# Patient Record
Sex: MALE | Race: OTHER | Employment: FULL TIME | URBAN - METROPOLITAN AREA
[De-identification: names, ages, dates, MRNs, and addresses within clinical notes are randomized per-mention and may not be internally consistent; named-entity substitution may affect disease eponyms.]

---

## 2019-12-19 ENCOUNTER — APPOINTMENT (OUTPATIENT)
Dept: CV DIAGNOSTICS | Facility: HOSPITAL | Age: 44
DRG: 270 | End: 2019-12-19
Attending: INTERNAL MEDICINE
Payer: COMMERCIAL

## 2019-12-19 ENCOUNTER — HOSPITAL ENCOUNTER (INPATIENT)
Facility: HOSPITAL | Age: 44
LOS: 2 days | Discharge: HOME | DRG: 270 | End: 2019-12-21
Attending: EMERGENCY MEDICINE | Admitting: INTERNAL MEDICINE
Payer: COMMERCIAL

## 2019-12-19 ENCOUNTER — APPOINTMENT (OUTPATIENT)
Dept: GENERAL RADIOLOGY | Facility: HOSPITAL | Age: 44
DRG: 270 | End: 2019-12-19
Attending: EMERGENCY MEDICINE
Payer: COMMERCIAL

## 2019-12-19 ENCOUNTER — APPOINTMENT (OUTPATIENT)
Dept: INTERVENTIONAL RADIOLOGY/VASCULAR | Facility: HOSPITAL | Age: 44
DRG: 270 | End: 2019-12-19
Attending: INTERNAL MEDICINE
Payer: COMMERCIAL

## 2019-12-19 DIAGNOSIS — I21.3 ST ELEVATION MYOCARDIAL INFARCTION (STEMI), UNSPECIFIED ARTERY (HCC): Primary | ICD-10-CM

## 2019-12-19 PROCEDURE — 027034Z DILATION OF CORONARY ARTERY, ONE ARTERY WITH DRUG-ELUTING INTRALUMINAL DEVICE, PERCUTANEOUS APPROACH: ICD-10-PCS | Performed by: INTERNAL MEDICINE

## 2019-12-19 PROCEDURE — 4A023N7 MEASUREMENT OF CARDIAC SAMPLING AND PRESSURE, LEFT HEART, PERCUTANEOUS APPROACH: ICD-10-PCS | Performed by: INTERNAL MEDICINE

## 2019-12-19 PROCEDURE — 99223 1ST HOSP IP/OBS HIGH 75: CPT | Performed by: INTERNAL MEDICINE

## 2019-12-19 PROCEDURE — 71045 X-RAY EXAM CHEST 1 VIEW: CPT | Performed by: EMERGENCY MEDICINE

## 2019-12-19 PROCEDURE — B2111ZZ FLUOROSCOPY OF MULTIPLE CORONARY ARTERIES USING LOW OSMOLAR CONTRAST: ICD-10-PCS | Performed by: INTERNAL MEDICINE

## 2019-12-19 PROCEDURE — B241ZZ3 ULTRASONOGRAPHY OF MULTIPLE CORONARY ARTERIES, INTRAVASCULAR: ICD-10-PCS | Performed by: INTERNAL MEDICINE

## 2019-12-19 PROCEDURE — 93306 TTE W/DOPPLER COMPLETE: CPT | Performed by: INTERNAL MEDICINE

## 2019-12-19 PROCEDURE — 5A02210 ASSISTANCE WITH CARDIAC OUTPUT USING BALLOON PUMP, CONTINUOUS: ICD-10-PCS | Performed by: INTERNAL MEDICINE

## 2019-12-19 PROCEDURE — 5A2204Z RESTORATION OF CARDIAC RHYTHM, SINGLE: ICD-10-PCS | Performed by: INTERNAL MEDICINE

## 2019-12-19 RX ORDER — METOPROLOL TARTRATE 5 MG/5ML
INJECTION INTRAVENOUS
Status: COMPLETED
Start: 2019-12-19 | End: 2019-12-19

## 2019-12-19 RX ORDER — MORPHINE SULFATE 4 MG/ML
4 INJECTION, SOLUTION INTRAMUSCULAR; INTRAVENOUS ONCE
Status: COMPLETED | OUTPATIENT
Start: 2019-12-19 | End: 2019-12-19

## 2019-12-19 RX ORDER — ENALAPRIL MALEATE 5 MG/1
5 TABLET ORAL 2 TIMES DAILY
Status: DISCONTINUED | OUTPATIENT
Start: 2019-12-19 | End: 2019-12-21

## 2019-12-19 RX ORDER — AMIODARONE HYDROCHLORIDE 50 MG/ML
INJECTION, SOLUTION INTRAVENOUS
Status: COMPLETED
Start: 2019-12-19 | End: 2019-12-19

## 2019-12-19 RX ORDER — MORPHINE SULFATE 2 MG/ML
INJECTION, SOLUTION INTRAMUSCULAR; INTRAVENOUS
Status: DISPENSED
Start: 2019-12-19 | End: 2019-12-19

## 2019-12-19 RX ORDER — ONDANSETRON 2 MG/ML
4 INJECTION INTRAMUSCULAR; INTRAVENOUS ONCE
Status: COMPLETED | OUTPATIENT
Start: 2019-12-19 | End: 2019-12-19

## 2019-12-19 RX ORDER — MORPHINE SULFATE 2 MG/ML
2 INJECTION, SOLUTION INTRAMUSCULAR; INTRAVENOUS EVERY 4 HOURS PRN
Status: DISCONTINUED | OUTPATIENT
Start: 2019-12-19 | End: 2019-12-21

## 2019-12-19 RX ORDER — MIDAZOLAM HYDROCHLORIDE 1 MG/ML
INJECTION INTRAMUSCULAR; INTRAVENOUS
Status: COMPLETED
Start: 2019-12-19 | End: 2019-12-19

## 2019-12-19 RX ORDER — ASPIRIN 81 MG/1
324 TABLET, CHEWABLE ORAL ONCE
Status: COMPLETED | OUTPATIENT
Start: 2019-12-19 | End: 2019-12-19

## 2019-12-19 RX ORDER — NITROGLYCERIN 20 MG/100ML
20 INJECTION INTRAVENOUS CONTINUOUS
Status: DISCONTINUED | OUTPATIENT
Start: 2019-12-19 | End: 2019-12-21

## 2019-12-19 RX ORDER — ATORVASTATIN CALCIUM 40 MG/1
80 TABLET, FILM COATED ORAL NIGHTLY
Status: DISCONTINUED | OUTPATIENT
Start: 2019-12-19 | End: 2019-12-21

## 2019-12-19 RX ORDER — ONDANSETRON 2 MG/ML
INJECTION INTRAMUSCULAR; INTRAVENOUS
Status: DISPENSED
Start: 2019-12-19 | End: 2019-12-20

## 2019-12-19 RX ORDER — NITROGLYCERIN 0.4 MG/1
0.4 TABLET SUBLINGUAL ONCE
Status: COMPLETED | OUTPATIENT
Start: 2019-12-19 | End: 2019-12-19

## 2019-12-19 RX ORDER — ASPIRIN 81 MG/1
81 TABLET ORAL DAILY
Status: DISCONTINUED | OUTPATIENT
Start: 2019-12-20 | End: 2019-12-21

## 2019-12-19 RX ORDER — NITROGLYCERIN 20 MG/100ML
INJECTION INTRAVENOUS
Status: COMPLETED
Start: 2019-12-19 | End: 2019-12-19

## 2019-12-19 RX ORDER — MORPHINE SULFATE 4 MG/ML
INJECTION, SOLUTION INTRAMUSCULAR; INTRAVENOUS
Status: COMPLETED
Start: 2019-12-19 | End: 2019-12-19

## 2019-12-19 RX ORDER — LIDOCAINE HYDROCHLORIDE 20 MG/ML
INJECTION, SOLUTION EPIDURAL; INFILTRATION; INTRACAUDAL; PERINEURAL
Status: COMPLETED
Start: 2019-12-19 | End: 2019-12-19

## 2019-12-19 RX ORDER — ACETAMINOPHEN 325 MG/1
650 TABLET ORAL EVERY 6 HOURS PRN
Status: DISCONTINUED | OUTPATIENT
Start: 2019-12-19 | End: 2019-12-21

## 2019-12-19 RX ORDER — SODIUM CHLORIDE 9 MG/ML
INJECTION, SOLUTION INTRAVENOUS CONTINUOUS
Status: ACTIVE | OUTPATIENT
Start: 2019-12-19 | End: 2019-12-19

## 2019-12-19 RX ORDER — ONDANSETRON 2 MG/ML
4 INJECTION INTRAMUSCULAR; INTRAVENOUS EVERY 6 HOURS PRN
Status: DISCONTINUED | OUTPATIENT
Start: 2019-12-19 | End: 2019-12-21

## 2019-12-19 RX ORDER — MIDAZOLAM HYDROCHLORIDE 1 MG/ML
INJECTION INTRAMUSCULAR; INTRAVENOUS
Status: DISCONTINUED
Start: 2019-12-19 | End: 2019-12-19 | Stop reason: WASHOUT

## 2019-12-19 RX ORDER — HEPARIN SODIUM 1000 [USP'U]/ML
INJECTION, SOLUTION INTRAVENOUS; SUBCUTANEOUS
Status: COMPLETED
Start: 2019-12-19 | End: 2019-12-19

## 2019-12-19 NOTE — H&P
Dorota Song 87 Patient Status:  Inpatient    1975 MRN [id number]   Location [facility] 2W/SW Attending Paulette Perez DO   Hosp Day # 0 PCP No primary care provider on file.      Date of Followed by  Amiodarone HCl (CORDARONE) 450 mg in dextrose 5 % 250 mL infusion, 0.5 mg/min, Intravenous, Continuous  nitroGLYCERIN infusion 50mg in D5W 250ml, 20 mcg/min, Intravenous, Continuous  Enalapril Maleate (VASOTEC) tab 5 mg, 5 mg, Oral, BID  morp PTP 12.6 12/19/2019     12/19/2019    MG 2.5 12/19/2019    TROP <0.045 12/19/2019         Imaging  Xr Chest Ap Portable  (cpt=71045)    Result Date: 12/19/2019  PROCEDURE: XR CHEST AP PORTABLE (CPT=71010) TIME: 06:46  COMPARISON: None.   INDICATIO

## 2019-12-19 NOTE — PLAN OF CARE
C/O chest pain and nauseated. Informed cardiology APN Radhika. EKG done. Zofran given. Pain rated zero without any further intervention.

## 2019-12-19 NOTE — IVS NOTE
Patient went Vfib arrest in transit patient received cardioversion 150 joules with conversion to sinus rhythm patient a/o, patient delivered to Cath Lab

## 2019-12-19 NOTE — PROCEDURES
Preop: Acute anterior wall myocardial infarction  Postop: Same  Procedure: Left heart cath, coronary angiogram.  PCI of LAD. PTCA of diagonal.  Intravascular ultrasound. Intra-aortic balloon pump.     Findings: Circumflex: Mild disease, RCA normal.  LAD 1

## 2019-12-19 NOTE — PROGRESS NOTES
Prior to leaving the Cath Lab it was noted that the pressure tracing for the balloon pump was flat. We attempted to aspirate the balloon pump but were unable. It is likely that it thrombosed. Therefore we removed the balloon pump and placed a FemoStop.

## 2019-12-19 NOTE — DIETARY NOTE
NUTRITION:  Diet Education    Consult received for diet education per protocol. Education deferred until s/p intervention and when appropriate for teaching.     Eden Elizalde RD,LDN  [phone]

## 2019-12-19 NOTE — CONSULTS
[facility]    PATIENT'S NAME: Lorenza Morgan   ATTENDING PHYSICIAN: Shimon Sanchez. [facility]: Bakari Lyles MD   PATIENT ACCOUNT#:   [id number]    LOCATION:  [address] [place] [address] RECORD #:   [id number]       DATE OF BIRTH: present. No organomegaly, masses, bruits, or pulsations. EXTREMITIES:  Warm and dry. Pulses diminished, but present. No cyanosis, clubbing, or edema. No calf, thigh, or popliteal tenderness.   NEUROLOGIC:  Normal.  SKIN:  Normal.     LABORATORY DATA:

## 2019-12-19 NOTE — PROCEDURES
[facility]    PATIENT'S NAME: Cyrus Ruvalcaba   ATTENDING PHYSICIAN: Kan Patel. Benson Rosario DO   OPERATING PHYSICIAN: Rocky Zelaya MD   PATIENT ACCOUNT#:   [id number]    LOCATION:  [address] [place] [address] RECORD #:   [id number]       DATE OF BIRTH:  0 descending artery gives off a moderate-sized diagonal just proximal to the takeoff of the major first septal . Just after the takeoff of that diagonal and the septal , the LAD is totally occluded.       RIGHT CORONARY ANGIOGRAPHY:  Afte the apical segment of the LAD. High-pressure balloon inflations were made along the entire length of the LAD stenting, with improvement in proximal flow.   We then placed an [place] aspiration catheter into the apical segment and retrieved a moderate amount

## 2019-12-19 NOTE — ED PROVIDER NOTES
Patient Seen in: [facility] AND [facility] Emergency Department      History   Patient presents with:  Chest Pain    Stated Complaint: chest pain    HPI    26-year-old male presents for complaint of chest pain.   Patient states that he was brushing his teeth this Lids are normal.      Extraocular Movements: Extraocular movements intact. Pupils: Pupils are equal, round, and reactive to light. Neck:      Musculoskeletal: Normal range of motion and neck supple.    Cardiovascular:      Rate and Rhythm: Normal rat BASIC METABOLIC PANEL (8)   TROPONIN I   MAGNESIUM   HEPATIC FUNCTION PANEL (7)   LIPASE   RAINBOW DRAW BLUE   RAINBOW DRAW LAVENDER   RAINBOW DRAW LIGHT GREEN   RAINBOW DRAW GOLD   CBC W/ DIFFERENTIAL     EKG    Rate, intervals and axes as noted on EKG

## 2019-12-19 NOTE — ED INITIAL ASSESSMENT (HPI)
Sharp midsternal chest pain appx 30 minutes ago, pt awake and getting ready for work at onset of symptoms.

## 2019-12-19 NOTE — CONSULTS
Cardiology (consult dictated)    Assessment:  1. Acute anterior wall myocardial infarction. Hemodynamically stable. Ongoing severe chest pain. No prior cardiac history or significant past medical history    Plan:  1.   Aspirin, nitroglycerin has been gi

## 2019-12-20 PROCEDURE — 99232 SBSQ HOSP IP/OBS MODERATE 35: CPT | Performed by: INTERNAL MEDICINE

## 2019-12-20 RX ORDER — METOPROLOL SUCCINATE 25 MG/1
25 TABLET, EXTENDED RELEASE ORAL
Status: DISCONTINUED | OUTPATIENT
Start: 2019-12-20 | End: 2019-12-21

## 2019-12-20 RX ORDER — HEPARIN SODIUM AND DEXTROSE 10000; 5 [USP'U]/100ML; G/100ML
12 INJECTION INTRAVENOUS ONCE
Status: COMPLETED | OUTPATIENT
Start: 2019-12-20 | End: 2019-12-20

## 2019-12-20 RX ORDER — HEPARIN SODIUM 1000 [USP'U]/ML
5000 INJECTION, SOLUTION INTRAVENOUS; SUBCUTANEOUS ONCE
Status: COMPLETED | OUTPATIENT
Start: 2019-12-20 | End: 2019-12-20

## 2019-12-20 RX ORDER — SPIRONOLACTONE 25 MG/1
25 TABLET ORAL DAILY
Status: DISCONTINUED | OUTPATIENT
Start: 2019-12-20 | End: 2019-12-21

## 2019-12-20 RX ORDER — HEPARIN SODIUM AND DEXTROSE 10000; 5 [USP'U]/100ML; G/100ML
INJECTION INTRAVENOUS CONTINUOUS
Status: DISCONTINUED | OUTPATIENT
Start: 2019-12-20 | End: 2019-12-21

## 2019-12-20 NOTE — CARDIAC REHAB
Cardiac Rehab Phase I    Education:  Handouts provided and reviewed: Caring For Your Heart Booklet, CV Procedure Site Care Handout and Smoking Cessation Handout, and Controlling Your Risk Factors. Diet: Healthy Cardiac diet reviewed.     Disease Proce

## 2019-12-20 NOTE — PROGRESS NOTES
[facility]     Progress Note        Kaitlynn Mojica Patient Status:  Inpatient    1975 MRN [id number]   Location [facility] 2W/SW Attending Lucy Moran, DO   Hosp Day # 1 PCP Kelton Finley       Subjective:   Patient s dry      Results:     Lab Results   Component Value Date    WBC 13.9 12/20/2019    HGB 13.7 12/20/2019    HCT 38.7 12/20/2019    .0 12/20/2019    CREATSERUM 1.05 12/20/2019    BUN 12 12/20/2019     12/20/2019    K 3.9 12/20/2019     12/2

## 2019-12-20 NOTE — PLAN OF CARE
Problem: Patient Centered Care  Goal: Patient preferences are identified and integrated in the patient's plan of care  Description  Interventions:  - What would you like us to know as we care for you? From home lives with wife; Never been hospitalized.

## 2019-12-20 NOTE — DIETARY NOTE
NUTRITION EDUCATION NOTE    Received consult for nutrition education per cardiac rehab order set. Appropriate education and handout(s) provided. See education section of Epic for specifics.     Joanne Tucker RDN, LDN  Clinical Nutrition  Ext [phone]

## 2019-12-21 VITALS
HEART RATE: 79 BPM | SYSTOLIC BLOOD PRESSURE: 105 MMHG | OXYGEN SATURATION: 98 % | WEIGHT: 197 LBS | DIASTOLIC BLOOD PRESSURE: 69 MMHG | RESPIRATION RATE: 18 BRPM | TEMPERATURE: 99 F | BODY MASS INDEX: 29.86 KG/M2 | HEIGHT: 68 IN

## 2019-12-21 PROCEDURE — 99232 SBSQ HOSP IP/OBS MODERATE 35: CPT | Performed by: INTERNAL MEDICINE

## 2019-12-21 PROCEDURE — 99239 HOSP IP/OBS DSCHRG MGMT >30: CPT | Performed by: HOSPITALIST

## 2019-12-21 RX ORDER — METOPROLOL SUCCINATE 25 MG/1
25 TABLET, EXTENDED RELEASE ORAL
Qty: 60 TABLET | Refills: 3 | Status: SHIPPED | OUTPATIENT
Start: 2019-12-21 | End: 2019-12-21

## 2019-12-21 RX ORDER — SPIRONOLACTONE 25 MG/1
25 TABLET ORAL DAILY
Qty: 30 TABLET | Refills: 3 | Status: SHIPPED | OUTPATIENT
Start: 2019-12-22

## 2019-12-21 RX ORDER — ATORVASTATIN CALCIUM 80 MG/1
80 TABLET, FILM COATED ORAL NIGHTLY
Qty: 30 TABLET | Refills: 3 | Status: SHIPPED | OUTPATIENT
Start: 2019-12-21

## 2019-12-21 RX ORDER — METOPROLOL SUCCINATE 25 MG/1
25 TABLET, EXTENDED RELEASE ORAL DAILY
Qty: 30 TABLET | Refills: 3 | Status: SHIPPED | OUTPATIENT
Start: 2019-12-21

## 2019-12-21 RX ORDER — ENALAPRIL MALEATE 5 MG/1
5 TABLET ORAL 2 TIMES DAILY
Qty: 60 TABLET | Refills: 3 | Status: SHIPPED | OUTPATIENT
Start: 2019-12-21

## 2019-12-21 NOTE — PROGRESS NOTES
[facility]    Progress Note      Assessment and Plan:   1. Status post ST elevation myocardial infarction status post stenting of LAD–the patient is breathing comfortably without chest pain this morning.     Recommendations: Agree with disc

## 2019-12-21 NOTE — PLAN OF CARE
Pt received awake and alert. Denies any pain or SOB at this time. Heparin gtt running on assessment. No bleeding noted. Heparin gtt stopped and xarelto given. OK for discharge. Pt aware. Telemetry and peripheral IV removed. Site CDI.  Medications picks up b environment to reduce risk of injury  - Provide assistive devices as appropriate  - Consider OT/PT consult to assist with strengthening/mobility  - Encourage toileting schedule  Outcome: Adequate for Discharge

## 2019-12-21 NOTE — PROGRESS NOTES
[facility]    Cardiology Progress Note    Clifton Khanna Patient Status:  Inpatient    1975 MRN [id number]   Location [facility] [place] 3W/SW Attending Anjelica Roblero MD   [facility] Day # 2 PCP Kellie Cabrera     Subjective:   Guilherme Montes De Oca apical dyskinesis. Will stop heparin and start xarelto 20 mg po daily. - cardiac rehab and dietician following patient  - patient is stable for d/c home today. Follow-up with SUZI AN in one week and then with Dr. Mumtaz Vargas.       Results:     Lab Results

## 2019-12-21 NOTE — PLAN OF CARE
Problem: Patient Centered Care  Goal: Patient preferences are identified and integrated in the patient's plan of care  Description  Interventions:  - What would you like us to know as we care for you?   - Provide timely, complete, and accurate informatio Term Goal: be free of pain     Interventions:   - monitor VS   - heparin drip   - Pain medications   - nonpharm methods   - See additional Care Plan goals for specific interventions  Outcome: Progressing     Problem: SAFETY ADULT - FALL  Goal: Free from fa

## 2019-12-21 NOTE — DISCHARGE SUMMARY
[facility] Discharge Summary   Patient ID:  Nayeli Bass  [id number]  40year old  7/14/1975    Admit date: 12/19/2019  Discharge date: 12/21/2019  Primary Care Physician: Carlos Dutton   Attending Physician: Prakash Stein MD   Consults: apparent distress, comfortable  NEURO: A/A Ox3, no focal deficits  RESP: non labored, CTAB/L  CARDIO: Regular, no murmur  ABD: soft, NT, ND  EXTREMITIES: no edema, no calf tenderness    Operative Procedures:   Radiology:   Xr Chest Ap Portable  (cpt=71045) Medicine  Contact information:  [address] [facility]             Janeth Andre MD In 2 weeks.     Specialty:  CARDIOLOGY  Contact information:  [address]  [address]

## 2019-12-22 NOTE — PAYOR COMM NOTE
--------------  DISCHARGE REVIEW    Payor: [address] #:  [id number]  Authorization Number: [id number]    Admit date: 12/19/19  Admit time:  0933  Discharge Date: 12/21/2019  2:00 PM     Admitting Physician: Shaheen Sears

## 2019-12-23 ENCOUNTER — TELEPHONE (OUTPATIENT)
Dept: CASE MANAGEMENT | Age: 44
End: 2019-12-23

## 2019-12-31 ENCOUNTER — OFFICE VISIT (OUTPATIENT)
Dept: CARDIOLOGY CLINIC | Facility: CLINIC | Age: 44
End: 2019-12-31
Payer: COMMERCIAL

## 2019-12-31 ENCOUNTER — APPOINTMENT (OUTPATIENT)
Dept: LAB | Age: 44
End: 2019-12-31
Attending: NURSE PRACTITIONER
Payer: COMMERCIAL

## 2019-12-31 VITALS
HEART RATE: 60 BPM | BODY MASS INDEX: 30.01 KG/M2 | WEIGHT: 198 LBS | DIASTOLIC BLOOD PRESSURE: 66 MMHG | HEIGHT: 68 IN | RESPIRATION RATE: 18 BRPM | SYSTOLIC BLOOD PRESSURE: 104 MMHG

## 2019-12-31 DIAGNOSIS — I25.10 CORONARY ARTERY DISEASE INVOLVING NATIVE CORONARY ARTERY OF NATIVE HEART WITHOUT ANGINA PECTORIS: ICD-10-CM

## 2019-12-31 DIAGNOSIS — I25.10 CORONARY ARTERY DISEASE INVOLVING NATIVE CORONARY ARTERY OF NATIVE HEART WITHOUT ANGINA PECTORIS: Primary | ICD-10-CM

## 2019-12-31 LAB
ALT SERPL-CCNC: 36 U/L (ref 16–61)
ALT SERPL-CCNC: 36 UNITS/L
ANION GAP SERPL CALC-SCNC: 4 MMOL/L (ref 0–18)
ANION GAP SERPL CALC-SCNC: NORMAL MMOL/L
AST SERPL-CCNC: 15 U/L (ref 15–37)
AST SERPL-CCNC: 15 UNITS/L
BUN BLD-MCNC: 22 MG/DL (ref 7–18)
BUN SERPL-MCNC: 22 MG/DL
BUN/CREAT SERPL: 17.9 (ref 10–20)
BUN/CREAT SERPL: NORMAL
CALCIUM BLD-MCNC: 9.7 MG/DL (ref 8.5–10.1)
CALCIUM SERPL-MCNC: 9.7 MG/DL
CHLORIDE SERPL-SCNC: 102 MMOL/L
CHLORIDE SERPL-SCNC: 102 MMOL/L (ref 98–112)
CHOLEST SERPL-MCNC: 97 MG/DL
CHOLEST SMN-MCNC: 97 MG/DL (ref ?–200)
CHOLEST/HDLC SERPL: NORMAL {RATIO}
CO2 SERPL-SCNC: 29 MMOL/L (ref 21–32)
CO2 SERPL-SCNC: NORMAL MMOL/L
CREAT BLD-MCNC: 1.23 MG/DL (ref 0.7–1.3)
CREAT SERPL-MCNC: 1.23 MG/DL
GLUCOSE BLD-MCNC: 93 MG/DL (ref 70–99)
GLUCOSE SERPL-MCNC: 93 MG/DL
HDLC SERPL-MCNC: 30 MG/DL
HDLC SERPL-MCNC: 30 MG/DL (ref 40–59)
LDLC SERPL CALC-MCNC: 49 MG/DL
LDLC SERPL CALC-MCNC: 49 MG/DL (ref ?–100)
LENGTH OF FAST TIME PATIENT: NO H
LENGTH OF FAST TIME PATIENT: NORMAL H
NONHDLC SERPL-MCNC: 67 MG/DL
NONHDLC SERPL-MCNC: 67 MG/DL (ref ?–130)
OSMOLALITY SERPL CALC.SUM OF ELEC: 283 MOSM/KG (ref 275–295)
PATIENT FASTING Y/N/NP: NO
PATIENT FASTING Y/N/NP: NO
POTASSIUM SERPL-SCNC: 5 MMOL/L
POTASSIUM SERPL-SCNC: 5 MMOL/L (ref 3.5–5.1)
SODIUM SERPL-SCNC: 135 MMOL/L
SODIUM SERPL-SCNC: 135 MMOL/L (ref 136–145)
TRIGL SERPL-MCNC: 89 MG/DL
TRIGL SERPL-MCNC: 89 MG/DL (ref 30–149)
VLDLC SERPL CALC-MCNC: 18 MG/DL (ref 0–30)
VLDLC SERPL CALC-MCNC: NORMAL MG/DL

## 2019-12-31 PROCEDURE — 99214 OFFICE O/P EST MOD 30 MIN: CPT | Performed by: NURSE PRACTITIONER

## 2019-12-31 PROCEDURE — 1111F DSCHRG MED/CURRENT MED MERGE: CPT | Performed by: NURSE PRACTITIONER

## 2019-12-31 PROCEDURE — 80061 LIPID PANEL: CPT

## 2019-12-31 PROCEDURE — 36415 COLL VENOUS BLD VENIPUNCTURE: CPT

## 2019-12-31 PROCEDURE — 80048 BASIC METABOLIC PNL TOTAL CA: CPT

## 2019-12-31 PROCEDURE — 84460 ALANINE AMINO (ALT) (SGPT): CPT

## 2019-12-31 PROCEDURE — 84450 TRANSFERASE (AST) (SGOT): CPT

## 2019-12-31 PROCEDURE — 99212 OFFICE O/P EST SF 10 MIN: CPT | Performed by: NURSE PRACTITIONER

## 2019-12-31 NOTE — PATIENT INSTRUCTIONS
1. Continue same medications  2. Start cardiac rehab  3. Check cholesterol in 2 months  4. Follow up with Dr. Bishop Vázquez in 3-4 weeks  5.  Blood test today
Yes

## 2019-12-31 NOTE — PROGRESS NOTES
Evaristo Gonzales is a 40year old male. Patient presents with:  Hospital F/U: rt groin check  CAD: PTCA w/ stent 12/19/19  Dizziness: slight dizziness w/ exertion    HPI:   Patient comes in today for follow-up.  He sees Dr. Larissa Ga after he presented for [facility] 12/20/19   • Coronary atherosclerosis    • Heart attack [facility])       Social History:  Social History    Tobacco Use      Smoking status: Current Every Day Smoker        Packs/day: 0.50        Years: 7.00        Pack years: 3.5    Alcohol use:  Yes      Alcoh months he will get a cholesterol check. The patient indicates understanding of these issues and agrees to the plan. The patient is asked to return in 4 weeks.       JENNY Troy  12/31/2019  10:57 AM

## 2020-01-02 ENCOUNTER — APPOINTMENT (OUTPATIENT)
Dept: CARDIOLOGY | Age: 45
End: 2020-01-02

## 2020-01-03 ENCOUNTER — TELEPHONE (OUTPATIENT)
Dept: ADMINISTRATIVE | Age: 45
End: 2020-01-03

## 2020-01-03 NOTE — TELEPHONE ENCOUNTER
Dr. Scott Caba,    I have attached 2 forms that require your signature. One FMLA and other a Disab. Please sign off on form:  -Highlight the patient and hit \"Chart\" button.   -In Chart Review, w/in the Encounter tab - click 1 time on the Telephone call enc

## 2020-01-03 NOTE — TELEPHONE ENCOUNTER
Patient dropped off [facility]. and FMLA forms. Signed HIPAA and paid form fee.  SC

## 2020-01-07 ENCOUNTER — CARDPULM VISIT (OUTPATIENT)
Dept: CARDIAC REHAB | Facility: HOSPITAL | Age: 45
End: 2020-01-07
Attending: INTERNAL MEDICINE
Payer: COMMERCIAL

## 2020-01-07 DIAGNOSIS — I25.10 CORONARY ARTERY DISEASE INVOLVING NATIVE CORONARY ARTERY OF NATIVE HEART WITHOUT ANGINA PECTORIS: ICD-10-CM

## 2020-01-07 NOTE — TELEPHONE ENCOUNTER
Completed FMLA and Disab. Signed by Antonio Adan and faxed to [address] [place] [address] [phone]. Patient notified and will  forms at the [facility] location, [facility] check in desk.  SC

## 2020-01-08 ENCOUNTER — CARDPULM VISIT (OUTPATIENT)
Dept: CARDIAC REHAB | Facility: HOSPITAL | Age: 45
End: 2020-01-08
Attending: INTERNAL MEDICINE
Payer: COMMERCIAL

## 2020-01-08 PROCEDURE — 93798 PHYS/QHP OP CAR RHAB W/ECG: CPT

## 2020-01-09 ENCOUNTER — TELEPHONE (OUTPATIENT)
Dept: CARDIOLOGY CLINIC | Facility: CLINIC | Age: 45
End: 2020-01-09

## 2020-01-09 NOTE — TELEPHONE ENCOUNTER
Notes recorded by JENNY Wade on 1/6/2020 at 5:16 PM CST  Lipids are stable, continue present management. Recheck in 1 yr. AST/ALT/BMP stable as well    No FYI on chart. Patient's mailbox full. [facility] number left.  Labs stable, letter mailed w

## 2020-01-09 NOTE — TELEPHONE ENCOUNTER
Pt's wife, Adrian Infante, returned call. No POPEYE on file. Pt was able to gave verbal ok to give test results to wife. Informed of Caitlin's message. Verbalized understanding.

## 2020-01-10 ENCOUNTER — CARDPULM VISIT (OUTPATIENT)
Dept: CARDIAC REHAB | Facility: HOSPITAL | Age: 45
End: 2020-01-10
Attending: INTERNAL MEDICINE
Payer: COMMERCIAL

## 2020-01-10 PROCEDURE — 93798 PHYS/QHP OP CAR RHAB W/ECG: CPT

## 2020-01-13 ENCOUNTER — CARDPULM VISIT (OUTPATIENT)
Dept: CARDIAC REHAB | Facility: HOSPITAL | Age: 45
End: 2020-01-13
Attending: INTERNAL MEDICINE
Payer: COMMERCIAL

## 2020-01-13 PROCEDURE — 93798 PHYS/QHP OP CAR RHAB W/ECG: CPT

## 2020-01-14 ENCOUNTER — TELEPHONE (OUTPATIENT)
Dept: CARDIOLOGY | Age: 45
End: 2020-01-14

## 2020-01-15 ENCOUNTER — CARDPULM VISIT (OUTPATIENT)
Dept: CARDIAC REHAB | Facility: HOSPITAL | Age: 45
End: 2020-01-15
Attending: INTERNAL MEDICINE
Payer: COMMERCIAL

## 2020-01-15 PROCEDURE — 93798 PHYS/QHP OP CAR RHAB W/ECG: CPT

## 2020-01-16 ENCOUNTER — TELEPHONE (OUTPATIENT)
Dept: CARDIOLOGY | Age: 45
End: 2020-01-16

## 2020-01-17 ENCOUNTER — CARDPULM VISIT (OUTPATIENT)
Dept: CARDIAC REHAB | Facility: HOSPITAL | Age: 45
End: 2020-01-17
Attending: INTERNAL MEDICINE
Payer: COMMERCIAL

## 2020-01-17 PROCEDURE — 93798 PHYS/QHP OP CAR RHAB W/ECG: CPT

## 2020-01-20 ENCOUNTER — CARDPULM VISIT (OUTPATIENT)
Dept: CARDIAC REHAB | Facility: HOSPITAL | Age: 45
End: 2020-01-20
Attending: INTERNAL MEDICINE
Payer: COMMERCIAL

## 2020-01-20 PROCEDURE — 93798 PHYS/QHP OP CAR RHAB W/ECG: CPT

## 2020-01-22 ENCOUNTER — CARDPULM VISIT (OUTPATIENT)
Dept: CARDIAC REHAB | Facility: HOSPITAL | Age: 45
End: 2020-01-22
Attending: INTERNAL MEDICINE
Payer: COMMERCIAL

## 2020-01-22 PROCEDURE — 93798 PHYS/QHP OP CAR RHAB W/ECG: CPT

## 2020-01-24 ENCOUNTER — CARDPULM VISIT (OUTPATIENT)
Dept: CARDIAC REHAB | Facility: HOSPITAL | Age: 45
End: 2020-01-24
Attending: INTERNAL MEDICINE
Payer: COMMERCIAL

## 2020-01-24 PROCEDURE — 93798 PHYS/QHP OP CAR RHAB W/ECG: CPT

## 2020-01-27 ENCOUNTER — CARDPULM VISIT (OUTPATIENT)
Dept: CARDIAC REHAB | Facility: HOSPITAL | Age: 45
End: 2020-01-27
Attending: INTERNAL MEDICINE
Payer: COMMERCIAL

## 2020-01-27 PROCEDURE — 93798 PHYS/QHP OP CAR RHAB W/ECG: CPT

## 2020-01-31 ENCOUNTER — CARDPULM VISIT (OUTPATIENT)
Dept: CARDIAC REHAB | Facility: HOSPITAL | Age: 45
End: 2020-01-31
Attending: INTERNAL MEDICINE
Payer: COMMERCIAL

## 2020-01-31 PROCEDURE — 93798 PHYS/QHP OP CAR RHAB W/ECG: CPT

## 2020-02-03 ENCOUNTER — CARDPULM VISIT (OUTPATIENT)
Dept: CARDIAC REHAB | Facility: HOSPITAL | Age: 45
End: 2020-02-03
Attending: INTERNAL MEDICINE
Payer: COMMERCIAL

## 2020-02-03 PROCEDURE — 93798 PHYS/QHP OP CAR RHAB W/ECG: CPT

## 2020-02-03 RX ORDER — ATORVASTATIN CALCIUM 80 MG/1
80 TABLET, FILM COATED ORAL AT BEDTIME
COMMUNITY
Start: 2019-12-21 | End: 2020-02-04 | Stop reason: SDUPTHER

## 2020-02-03 RX ORDER — SPIRONOLACTONE 25 MG/1
25 TABLET ORAL DAILY
COMMUNITY
Start: 2019-12-22 | End: 2020-02-04 | Stop reason: SDUPTHER

## 2020-02-03 RX ORDER — METOPROLOL SUCCINATE 25 MG/1
25 TABLET, EXTENDED RELEASE ORAL DAILY
COMMUNITY
Start: 2019-12-21 | End: 2020-02-04 | Stop reason: SDUPTHER

## 2020-02-03 RX ORDER — ENALAPRIL MALEATE 5 MG/1
5 TABLET ORAL 2 TIMES DAILY
COMMUNITY
Start: 2019-12-21 | End: 2020-02-04 | Stop reason: SDUPTHER

## 2020-02-04 ENCOUNTER — OFFICE VISIT (OUTPATIENT)
Dept: CARDIOLOGY | Age: 45
End: 2020-02-04

## 2020-02-04 VITALS
HEIGHT: 67 IN | OXYGEN SATURATION: 99 % | DIASTOLIC BLOOD PRESSURE: 68 MMHG | SYSTOLIC BLOOD PRESSURE: 104 MMHG | WEIGHT: 200.5 LBS | HEART RATE: 72 BPM | RESPIRATION RATE: 16 BRPM | BODY MASS INDEX: 31.47 KG/M2

## 2020-02-04 DIAGNOSIS — I25.10 CORONARY ARTERY DISEASE INVOLVING NATIVE CORONARY ARTERY OF NATIVE HEART WITHOUT ANGINA PECTORIS: Primary | ICD-10-CM

## 2020-02-04 DIAGNOSIS — I21.02 ST ELEVATION MYOCARDIAL INFARCTION INVOLVING LEFT ANTERIOR DESCENDING (LAD) CORONARY ARTERY (CMD): ICD-10-CM

## 2020-02-04 PROBLEM — I25.5 ISCHEMIC CARDIOMYOPATHY: Status: ACTIVE | Noted: 2020-02-04

## 2020-02-04 PROCEDURE — 99214 OFFICE O/P EST MOD 30 MIN: CPT | Performed by: INTERNAL MEDICINE

## 2020-02-04 RX ORDER — SPIRONOLACTONE 25 MG/1
25 TABLET ORAL DAILY
Qty: 90 TABLET | Refills: 3 | Status: SHIPPED | OUTPATIENT
Start: 2020-02-04 | End: 2021-02-05

## 2020-02-04 RX ORDER — ENALAPRIL MALEATE 5 MG/1
5 TABLET ORAL 2 TIMES DAILY
Qty: 90 TABLET | Refills: 3 | Status: SHIPPED | OUTPATIENT
Start: 2020-02-04 | End: 2020-10-23

## 2020-02-04 RX ORDER — ATORVASTATIN CALCIUM 80 MG/1
80 TABLET, FILM COATED ORAL AT BEDTIME
Qty: 90 TABLET | Refills: 3 | Status: SHIPPED | OUTPATIENT
Start: 2020-02-04 | End: 2020-02-10 | Stop reason: SDUPTHER

## 2020-02-04 RX ORDER — METOPROLOL SUCCINATE 25 MG/1
25 TABLET, EXTENDED RELEASE ORAL DAILY
Qty: 90 TABLET | Refills: 3 | Status: SHIPPED | OUTPATIENT
Start: 2020-02-04 | End: 2021-02-05

## 2020-02-04 ASSESSMENT — PATIENT HEALTH QUESTIONNAIRE - PHQ9
SUM OF ALL RESPONSES TO PHQ9 QUESTIONS 1 AND 2: 1
1. LITTLE INTEREST OR PLEASURE IN DOING THINGS: NOT AT ALL
2. FEELING DOWN, DEPRESSED OR HOPELESS: SEVERAL DAYS
SUM OF ALL RESPONSES TO PHQ9 QUESTIONS 1 AND 2: 1

## 2020-02-05 ENCOUNTER — CARDPULM VISIT (OUTPATIENT)
Dept: CARDIAC REHAB | Facility: HOSPITAL | Age: 45
End: 2020-02-05
Attending: INTERNAL MEDICINE
Payer: COMMERCIAL

## 2020-02-05 PROCEDURE — 93798 PHYS/QHP OP CAR RHAB W/ECG: CPT

## 2020-02-07 ENCOUNTER — ADVANCED DIRECTIVES (OUTPATIENT)
Dept: CARDIOLOGY | Age: 45
End: 2020-02-07

## 2020-02-07 ENCOUNTER — CARDPULM VISIT (OUTPATIENT)
Dept: CARDIAC REHAB | Facility: HOSPITAL | Age: 45
End: 2020-02-07
Attending: INTERNAL MEDICINE
Payer: COMMERCIAL

## 2020-02-07 PROCEDURE — 93798 PHYS/QHP OP CAR RHAB W/ECG: CPT

## 2020-02-10 ENCOUNTER — CARDPULM VISIT (OUTPATIENT)
Dept: CARDIAC REHAB | Facility: HOSPITAL | Age: 45
End: 2020-02-10
Attending: INTERNAL MEDICINE
Payer: COMMERCIAL

## 2020-02-10 PROCEDURE — 93798 PHYS/QHP OP CAR RHAB W/ECG: CPT

## 2020-02-10 RX ORDER — ATORVASTATIN CALCIUM 80 MG/1
80 TABLET, FILM COATED ORAL AT BEDTIME
Qty: 90 TABLET | Refills: 3 | Status: SHIPPED | OUTPATIENT
Start: 2020-03-10 | End: 2020-02-11 | Stop reason: SDUPTHER

## 2020-02-10 RX ORDER — ATORVASTATIN CALCIUM 80 MG/1
80 TABLET, FILM COATED ORAL DAILY
Qty: 30 TABLET | Refills: 0 | Status: SHIPPED | OUTPATIENT
Start: 2020-02-10 | End: 2020-02-11 | Stop reason: CLARIF

## 2020-02-11 RX ORDER — ATORVASTATIN CALCIUM 80 MG/1
80 TABLET, FILM COATED ORAL AT BEDTIME
Qty: 90 TABLET | Refills: 3 | Status: SHIPPED | OUTPATIENT
Start: 2020-03-10 | End: 2021-05-14

## 2020-02-12 ENCOUNTER — CARDPULM VISIT (OUTPATIENT)
Dept: CARDIAC REHAB | Facility: HOSPITAL | Age: 45
End: 2020-02-12
Attending: INTERNAL MEDICINE
Payer: COMMERCIAL

## 2020-02-12 PROCEDURE — 93798 PHYS/QHP OP CAR RHAB W/ECG: CPT

## 2020-02-14 ENCOUNTER — CARDPULM VISIT (OUTPATIENT)
Dept: CARDIAC REHAB | Facility: HOSPITAL | Age: 45
End: 2020-02-14
Attending: INTERNAL MEDICINE
Payer: COMMERCIAL

## 2020-02-14 PROCEDURE — 93798 PHYS/QHP OP CAR RHAB W/ECG: CPT

## 2020-02-17 ENCOUNTER — CARDPULM VISIT (OUTPATIENT)
Dept: CARDIAC REHAB | Facility: HOSPITAL | Age: 45
End: 2020-02-17
Attending: INTERNAL MEDICINE
Payer: COMMERCIAL

## 2020-02-17 PROCEDURE — 93798 PHYS/QHP OP CAR RHAB W/ECG: CPT

## 2020-02-19 ENCOUNTER — CARDPULM VISIT (OUTPATIENT)
Dept: CARDIAC REHAB | Facility: HOSPITAL | Age: 45
End: 2020-02-19
Attending: INTERNAL MEDICINE
Payer: COMMERCIAL

## 2020-02-19 PROCEDURE — 93798 PHYS/QHP OP CAR RHAB W/ECG: CPT

## 2020-02-21 ENCOUNTER — CARDPULM VISIT (OUTPATIENT)
Dept: CARDIAC REHAB | Facility: HOSPITAL | Age: 45
End: 2020-02-21
Attending: INTERNAL MEDICINE
Payer: COMMERCIAL

## 2020-02-21 PROCEDURE — 93798 PHYS/QHP OP CAR RHAB W/ECG: CPT

## 2020-02-24 ENCOUNTER — CARDPULM VISIT (OUTPATIENT)
Dept: CARDIAC REHAB | Facility: HOSPITAL | Age: 45
End: 2020-02-24
Attending: INTERNAL MEDICINE
Payer: COMMERCIAL

## 2020-02-24 PROCEDURE — 93798 PHYS/QHP OP CAR RHAB W/ECG: CPT

## 2020-02-26 ENCOUNTER — CARDPULM VISIT (OUTPATIENT)
Dept: CARDIAC REHAB | Facility: HOSPITAL | Age: 45
End: 2020-02-26
Attending: INTERNAL MEDICINE
Payer: COMMERCIAL

## 2020-02-26 PROCEDURE — 93798 PHYS/QHP OP CAR RHAB W/ECG: CPT

## 2020-02-28 ENCOUNTER — APPOINTMENT (OUTPATIENT)
Dept: CARDIAC REHAB | Facility: HOSPITAL | Age: 45
End: 2020-02-28
Attending: INTERNAL MEDICINE
Payer: COMMERCIAL

## 2020-02-28 PROCEDURE — 93798 PHYS/QHP OP CAR RHAB W/ECG: CPT

## 2020-03-02 ENCOUNTER — APPOINTMENT (OUTPATIENT)
Dept: CARDIAC REHAB | Facility: HOSPITAL | Age: 45
End: 2020-03-02
Attending: INTERNAL MEDICINE
Payer: COMMERCIAL

## 2020-03-04 ENCOUNTER — APPOINTMENT (OUTPATIENT)
Dept: CARDIAC REHAB | Facility: HOSPITAL | Age: 45
End: 2020-03-04
Attending: INTERNAL MEDICINE
Payer: COMMERCIAL

## 2020-03-06 ENCOUNTER — CARDPULM VISIT (OUTPATIENT)
Dept: CARDIAC REHAB | Facility: HOSPITAL | Age: 45
End: 2020-03-06
Attending: INTERNAL MEDICINE
Payer: COMMERCIAL

## 2020-03-06 PROCEDURE — 93798 PHYS/QHP OP CAR RHAB W/ECG: CPT

## 2020-03-09 ENCOUNTER — APPOINTMENT (OUTPATIENT)
Dept: CARDIAC REHAB | Facility: HOSPITAL | Age: 45
End: 2020-03-09
Attending: INTERNAL MEDICINE
Payer: COMMERCIAL

## 2020-03-09 PROCEDURE — 93798 PHYS/QHP OP CAR RHAB W/ECG: CPT

## 2020-03-11 ENCOUNTER — CARDPULM VISIT (OUTPATIENT)
Dept: CARDIAC REHAB | Facility: HOSPITAL | Age: 45
End: 2020-03-11
Attending: INTERNAL MEDICINE
Payer: COMMERCIAL

## 2020-03-11 ENCOUNTER — ANCILLARY PROCEDURE (OUTPATIENT)
Dept: CARDIOLOGY | Age: 45
End: 2020-03-11
Attending: INTERNAL MEDICINE

## 2020-03-11 DIAGNOSIS — I21.02 ST ELEVATION MYOCARDIAL INFARCTION INVOLVING LEFT ANTERIOR DESCENDING (LAD) CORONARY ARTERY (CMD): ICD-10-CM

## 2020-03-11 DIAGNOSIS — I25.10 CORONARY ARTERY DISEASE INVOLVING NATIVE CORONARY ARTERY OF NATIVE HEART WITHOUT ANGINA PECTORIS: ICD-10-CM

## 2020-03-11 PROCEDURE — 93308 TTE F-UP OR LMTD: CPT

## 2020-03-11 PROCEDURE — 93798 PHYS/QHP OP CAR RHAB W/ECG: CPT

## 2020-03-13 ENCOUNTER — APPOINTMENT (OUTPATIENT)
Dept: CARDIAC REHAB | Facility: HOSPITAL | Age: 45
End: 2020-03-13
Attending: INTERNAL MEDICINE
Payer: COMMERCIAL

## 2020-03-16 ENCOUNTER — APPOINTMENT (OUTPATIENT)
Dept: CARDIAC REHAB | Facility: HOSPITAL | Age: 45
End: 2020-03-16
Attending: INTERNAL MEDICINE
Payer: COMMERCIAL

## 2020-03-17 ENCOUNTER — TELEPHONE (OUTPATIENT)
Dept: CARDIOLOGY | Age: 45
End: 2020-03-17

## 2020-03-18 ENCOUNTER — APPOINTMENT (OUTPATIENT)
Dept: CARDIAC REHAB | Facility: HOSPITAL | Age: 45
End: 2020-03-18
Attending: INTERNAL MEDICINE
Payer: COMMERCIAL

## 2020-03-20 ENCOUNTER — APPOINTMENT (OUTPATIENT)
Dept: CARDIAC REHAB | Facility: HOSPITAL | Age: 45
End: 2020-03-20
Attending: INTERNAL MEDICINE
Payer: COMMERCIAL

## 2020-03-23 ENCOUNTER — APPOINTMENT (OUTPATIENT)
Dept: CARDIAC REHAB | Facility: HOSPITAL | Age: 45
End: 2020-03-23
Attending: INTERNAL MEDICINE
Payer: COMMERCIAL

## 2020-03-25 ENCOUNTER — APPOINTMENT (OUTPATIENT)
Dept: CARDIAC REHAB | Facility: HOSPITAL | Age: 45
End: 2020-03-25
Attending: INTERNAL MEDICINE
Payer: COMMERCIAL

## 2020-03-25 ENCOUNTER — TELEPHONE (OUTPATIENT)
Dept: CARDIOLOGY | Age: 45
End: 2020-03-25

## 2020-03-27 ENCOUNTER — APPOINTMENT (OUTPATIENT)
Dept: CARDIAC REHAB | Facility: HOSPITAL | Age: 45
End: 2020-03-27
Attending: INTERNAL MEDICINE
Payer: COMMERCIAL

## 2020-03-30 ENCOUNTER — APPOINTMENT (OUTPATIENT)
Dept: CARDIAC REHAB | Facility: HOSPITAL | Age: 45
End: 2020-03-30
Attending: INTERNAL MEDICINE
Payer: COMMERCIAL

## 2020-03-31 ENCOUNTER — OFFICE VISIT (OUTPATIENT)
Dept: CARDIOLOGY | Age: 45
End: 2020-03-31

## 2020-03-31 VITALS — HEIGHT: 67 IN | BODY MASS INDEX: 30.92 KG/M2 | WEIGHT: 197 LBS

## 2020-03-31 DIAGNOSIS — I25.10 CORONARY ARTERY DISEASE INVOLVING NATIVE CORONARY ARTERY OF NATIVE HEART WITHOUT ANGINA PECTORIS: Primary | ICD-10-CM

## 2020-03-31 DIAGNOSIS — I25.5 ISCHEMIC CARDIOMYOPATHY: ICD-10-CM

## 2020-03-31 PROCEDURE — 99214 OFFICE O/P EST MOD 30 MIN: CPT | Performed by: INTERNAL MEDICINE

## 2020-04-01 ENCOUNTER — APPOINTMENT (OUTPATIENT)
Dept: CARDIAC REHAB | Facility: HOSPITAL | Age: 45
End: 2020-04-01
Attending: INTERNAL MEDICINE
Payer: COMMERCIAL

## 2020-04-01 ENCOUNTER — TELEPHONE (OUTPATIENT)
Dept: CARDIOLOGY | Age: 45
End: 2020-04-01

## 2020-04-03 ENCOUNTER — APPOINTMENT (OUTPATIENT)
Dept: CARDIAC REHAB | Facility: HOSPITAL | Age: 45
End: 2020-04-03
Attending: INTERNAL MEDICINE
Payer: COMMERCIAL

## 2020-04-06 ENCOUNTER — APPOINTMENT (OUTPATIENT)
Dept: CARDIAC REHAB | Facility: HOSPITAL | Age: 45
End: 2020-04-06
Attending: INTERNAL MEDICINE
Payer: COMMERCIAL

## 2020-04-07 ENCOUNTER — APPOINTMENT (OUTPATIENT)
Dept: CARDIOLOGY | Age: 45
End: 2020-04-07

## 2020-04-08 ENCOUNTER — APPOINTMENT (OUTPATIENT)
Dept: CARDIAC REHAB | Facility: HOSPITAL | Age: 45
End: 2020-04-08
Attending: INTERNAL MEDICINE
Payer: COMMERCIAL

## 2020-04-20 ENCOUNTER — TELEPHONE (OUTPATIENT)
Dept: CARDIOLOGY | Age: 45
End: 2020-04-20

## 2020-06-15 ENCOUNTER — TELEPHONE (OUTPATIENT)
Dept: CARDIOLOGY | Age: 45
End: 2020-06-15

## 2020-08-02 ENCOUNTER — HOSPITAL ENCOUNTER (EMERGENCY)
Facility: HOSPITAL | Age: 45
Discharge: HOME | End: 2020-08-02
Attending: EMERGENCY MEDICINE
Payer: COMMERCIAL

## 2020-08-02 VITALS
OXYGEN SATURATION: 98 % | RESPIRATION RATE: 14 BRPM | HEART RATE: 64 BPM | DIASTOLIC BLOOD PRESSURE: 72 MMHG | TEMPERATURE: 99 F | SYSTOLIC BLOOD PRESSURE: 111 MMHG

## 2020-08-02 DIAGNOSIS — M79.602 ARM PAIN, ANTERIOR, LEFT: Primary | ICD-10-CM

## 2020-08-02 LAB
ANION GAP SERPL CALC-SCNC: 5 MMOL/L
ANION GAP SERPL CALC-SCNC: 5 MMOL/L (ref 0–18)
BASOPHILS # BLD AUTO: 0.05 X10(3) UL (ref 0–0.2)
BASOPHILS NFR BLD AUTO: 0.7 %
BUN BLD-MCNC: 12 MG/DL (ref 7–18)
BUN SERPL-MCNC: 12 MG/DL
BUN/CREAT SERPL: 10
BUN/CREAT SERPL: 10 (ref 10–20)
CALCIUM BLD-MCNC: 8.5 MG/DL (ref 8.5–10.1)
CALCIUM SERPL-MCNC: 8.5 MG/DL
CHLORIDE SERPL-SCNC: 108 MMOL/L
CHLORIDE SERPL-SCNC: 108 MMOL/L (ref 98–112)
CO2 SERPL-SCNC: 26 MMOL/L
CO2 SERPL-SCNC: 26 MMOL/L (ref 21–32)
CREAT BLD-MCNC: 1.2 MG/DL (ref 0.7–1.3)
CREAT SERPL-MCNC: 1.2 MG/DL
DEPRECATED RDW RBC AUTO: 41.8 FL (ref 35.1–46.3)
EOSINOPHIL # BLD AUTO: 0.19 X10(3) UL (ref 0–0.7)
EOSINOPHIL NFR BLD AUTO: 2.7 %
ERYTHROCYTE [DISTWIDTH] IN BLOOD BY AUTOMATED COUNT: 12.6 % (ref 11–15)
GLUCOSE BLD-MCNC: 117 MG/DL (ref 70–99)
GLUCOSE SERPL-MCNC: 117 MG/DL
HCT VFR BLD AUTO: 42.5 % (ref 39–53)
HCT VFR BLD CALC: 42.5 %
HGB BLD-MCNC: 15.1 G/DL
HGB BLD-MCNC: 15.1 G/DL (ref 13–17.5)
IMM GRANULOCYTES # BLD AUTO: 0.02 X10(3) UL (ref 0–1)
IMM GRANULOCYTES NFR BLD: 0.3 %
LYMPHOCYTES # BLD AUTO: 2.62 X10(3) UL (ref 1–4)
LYMPHOCYTES NFR BLD AUTO: 36.7 %
MCH RBC QN AUTO: 32.1 PG
MCH RBC QN AUTO: 32.1 PG (ref 26–34)
MCHC RBC AUTO-ENTMCNC: 35.5 G/DL
MCHC RBC AUTO-ENTMCNC: 35.5 G/DL (ref 31–37)
MCV RBC AUTO: 90.2 FL
MCV RBC AUTO: 90.2 FL (ref 80–100)
MONOCYTES # BLD AUTO: 0.59 X10(3) UL (ref 0.1–1)
MONOCYTES NFR BLD AUTO: 8.3 %
NEUTROPHILS # BLD AUTO: 3.67 X10 (3) UL (ref 1.5–7.7)
NEUTROPHILS # BLD AUTO: 3.67 X10(3) UL (ref 1.5–7.7)
NEUTROPHILS NFR BLD AUTO: 51.3 %
OSMOLALITY SERPL CALC.SUM OF ELEC: 289 MOSM/KG (ref 275–295)
PLATELET # BLD AUTO: 294 10(3)UL (ref 150–450)
PLATELET # BLD: 294 K/MCL
POTASSIUM SERPL-SCNC: 4 MMOL/L
POTASSIUM SERPL-SCNC: 4 MMOL/L (ref 3.5–5.1)
RBC # BLD AUTO: 4.71 X10(6)UL (ref 4.3–5.7)
RBC # BLD: 4.71 10*6/UL
SODIUM SERPL-SCNC: 139 MMOL/L
SODIUM SERPL-SCNC: 139 MMOL/L (ref 136–145)
TROPONIN I SERPL-MCNC: <0.045 NG/ML (ref ?–0.04)
WBC # BLD AUTO: 7.1 X10(3) UL (ref 4–11)
WBC # BLD: 7.1 K/MCL

## 2020-08-02 PROCEDURE — 85025 COMPLETE CBC W/AUTO DIFF WBC: CPT | Performed by: EMERGENCY MEDICINE

## 2020-08-02 PROCEDURE — 36415 COLL VENOUS BLD VENIPUNCTURE: CPT

## 2020-08-02 PROCEDURE — 80048 BASIC METABOLIC PNL TOTAL CA: CPT | Performed by: EMERGENCY MEDICINE

## 2020-08-02 PROCEDURE — 93010 ELECTROCARDIOGRAM REPORT: CPT | Performed by: EMERGENCY MEDICINE

## 2020-08-02 PROCEDURE — 84484 ASSAY OF TROPONIN QUANT: CPT | Performed by: EMERGENCY MEDICINE

## 2020-08-02 PROCEDURE — 99283 EMERGENCY DEPT VISIT LOW MDM: CPT

## 2020-08-02 PROCEDURE — 93005 ELECTROCARDIOGRAM TRACING: CPT

## 2020-08-03 NOTE — ED PROVIDER NOTES
Patient Seen in: [facility] Emergency Department    History   Patient presents with:  Chest Pain Angina      HPI    The patient presents to the ED complaining of left anterior arm pain that started a couple hours ago.   He describes the pain is in measures to prevent infection transmission during my interaction with the patient were taken. The patient was already wearing a droplet mask on my arrival to the room.  Personal protective equipment including droplet mask, eye protection, and gloves were wo Abnormality         Status                                     ---------                               -----------         ------                                     CBC W/ DIFFERENTIAL[[id number]]                              Final result and/or caregiver.       Condition upon leaving the department: Stable    Disposition and Plan     Clinical Impression:  Arm pain, anterior, left  (primary encounter diagnosis)    Disposition:  Discharge    Follow-up:  Lexy Kraft  [address]

## 2020-09-14 ENCOUNTER — ANCILLARY PROCEDURE (OUTPATIENT)
Dept: CARDIOLOGY | Age: 45
End: 2020-09-14
Attending: INTERNAL MEDICINE

## 2020-09-14 DIAGNOSIS — I25.5 ISCHEMIC CARDIOMYOPATHY: ICD-10-CM

## 2020-09-14 DIAGNOSIS — I25.10 CORONARY ARTERY DISEASE INVOLVING NATIVE CORONARY ARTERY OF NATIVE HEART WITHOUT ANGINA PECTORIS: ICD-10-CM

## 2020-09-14 PROCEDURE — 93306 TTE W/DOPPLER COMPLETE: CPT | Performed by: INTERNAL MEDICINE

## 2020-09-25 ENCOUNTER — OFFICE VISIT (OUTPATIENT)
Dept: CARDIOLOGY | Age: 45
End: 2020-09-25

## 2020-09-25 VITALS
HEIGHT: 67 IN | DIASTOLIC BLOOD PRESSURE: 80 MMHG | SYSTOLIC BLOOD PRESSURE: 120 MMHG | HEART RATE: 73 BPM | BODY MASS INDEX: 34.21 KG/M2 | WEIGHT: 218 LBS | OXYGEN SATURATION: 99 %

## 2020-09-25 DIAGNOSIS — I25.10 CORONARY ARTERY DISEASE INVOLVING NATIVE CORONARY ARTERY OF NATIVE HEART WITHOUT ANGINA PECTORIS: Primary | ICD-10-CM

## 2020-09-25 DIAGNOSIS — I25.5 ISCHEMIC CARDIOMYOPATHY: ICD-10-CM

## 2020-09-25 PROCEDURE — 99214 OFFICE O/P EST MOD 30 MIN: CPT | Performed by: INTERNAL MEDICINE

## 2020-09-25 ASSESSMENT — PATIENT HEALTH QUESTIONNAIRE - PHQ9
CLINICAL INTERPRETATION OF PHQ2 SCORE: NO FURTHER SCREENING NEEDED
CLINICAL INTERPRETATION OF PHQ9 SCORE: NO FURTHER SCREENING NEEDED
SUM OF ALL RESPONSES TO PHQ9 QUESTIONS 1 AND 2: 0
2. FEELING DOWN, DEPRESSED OR HOPELESS: NOT AT ALL
1. LITTLE INTEREST OR PLEASURE IN DOING THINGS: NOT AT ALL
SUM OF ALL RESPONSES TO PHQ9 QUESTIONS 1 AND 2: 0

## 2020-10-23 RX ORDER — ENALAPRIL MALEATE 5 MG/1
TABLET ORAL
Qty: 90 TABLET | Refills: 3 | Status: SHIPPED | OUTPATIENT
Start: 2020-10-23 | End: 2021-06-11

## 2021-02-05 RX ORDER — METOPROLOL SUCCINATE 25 MG/1
TABLET, EXTENDED RELEASE ORAL
Qty: 90 TABLET | Refills: 0 | Status: SHIPPED | OUTPATIENT
Start: 2021-02-05 | End: 2021-04-21

## 2021-02-05 RX ORDER — SPIRONOLACTONE 25 MG/1
TABLET ORAL
Qty: 90 TABLET | Refills: 0 | Status: SHIPPED | OUTPATIENT
Start: 2021-02-05 | End: 2021-04-21

## 2021-03-10 ENCOUNTER — ANCILLARY PROCEDURE (OUTPATIENT)
Dept: CARDIOLOGY | Age: 46
End: 2021-03-10
Attending: INTERNAL MEDICINE

## 2021-03-10 ENCOUNTER — DOCUMENTATION (OUTPATIENT)
Dept: CARDIOLOGY | Age: 46
End: 2021-03-10

## 2021-03-10 DIAGNOSIS — I25.10 CORONARY ARTERY DISEASE INVOLVING NATIVE CORONARY ARTERY OF NATIVE HEART WITHOUT ANGINA PECTORIS: ICD-10-CM

## 2021-03-10 DIAGNOSIS — I25.5 ISCHEMIC CARDIOMYOPATHY: ICD-10-CM

## 2021-03-10 PROCEDURE — 93351 STRESS TTE COMPLETE: CPT | Performed by: INTERNAL MEDICINE

## 2021-04-19 ENCOUNTER — TELEPHONE (OUTPATIENT)
Dept: CARDIOLOGY | Age: 46
End: 2021-04-19

## 2021-04-21 RX ORDER — METOPROLOL SUCCINATE 25 MG/1
25 TABLET, EXTENDED RELEASE ORAL DAILY
Qty: 90 TABLET | Refills: 0 | Status: SHIPPED | OUTPATIENT
Start: 2021-04-21

## 2021-04-21 RX ORDER — SPIRONOLACTONE 25 MG/1
25 TABLET ORAL DAILY
Qty: 90 TABLET | Refills: 0 | Status: SHIPPED | OUTPATIENT
Start: 2021-04-21

## 2021-04-21 RX ORDER — METOPROLOL SUCCINATE 25 MG/1
25 TABLET, EXTENDED RELEASE ORAL DAILY
Qty: 90 TABLET | Refills: 0 | Status: SHIPPED | OUTPATIENT
Start: 2021-04-21 | End: 2021-04-21 | Stop reason: SDUPTHER

## 2021-04-21 RX ORDER — SPIRONOLACTONE 25 MG/1
25 TABLET ORAL DAILY
Qty: 90 TABLET | Refills: 0 | Status: SHIPPED | OUTPATIENT
Start: 2021-04-21 | End: 2021-04-21 | Stop reason: SDUPTHER

## 2021-04-23 ENCOUNTER — OFFICE VISIT (OUTPATIENT)
Dept: CARDIOLOGY | Age: 46
End: 2021-04-23

## 2021-04-23 VITALS
BODY MASS INDEX: 34.37 KG/M2 | HEART RATE: 64 BPM | WEIGHT: 219 LBS | HEIGHT: 67 IN | DIASTOLIC BLOOD PRESSURE: 78 MMHG | SYSTOLIC BLOOD PRESSURE: 120 MMHG | OXYGEN SATURATION: 98 %

## 2021-04-23 DIAGNOSIS — I25.10 CORONARY ARTERY DISEASE INVOLVING NATIVE CORONARY ARTERY OF NATIVE HEART WITHOUT ANGINA PECTORIS: Primary | ICD-10-CM

## 2021-04-23 DIAGNOSIS — I25.5 ISCHEMIC CARDIOMYOPATHY: ICD-10-CM

## 2021-04-23 PROCEDURE — 99214 OFFICE O/P EST MOD 30 MIN: CPT | Performed by: INTERNAL MEDICINE

## 2021-04-23 RX ORDER — ETANERCEPT 50 MG/ML
50 SOLUTION SUBCUTANEOUS
COMMUNITY
Start: 2020-10-09

## 2021-04-23 RX ORDER — CLOPIDOGREL BISULFATE 75 MG/1
75 TABLET ORAL DAILY
Qty: 90 TABLET | Refills: 3 | Status: SHIPPED | OUTPATIENT
Start: 2021-04-23

## 2021-04-23 ASSESSMENT — PATIENT HEALTH QUESTIONNAIRE - PHQ9
1. LITTLE INTEREST OR PLEASURE IN DOING THINGS: NOT AT ALL
SUM OF ALL RESPONSES TO PHQ9 QUESTIONS 1 AND 2: 0
2. FEELING DOWN, DEPRESSED OR HOPELESS: NOT AT ALL
CLINICAL INTERPRETATION OF PHQ9 SCORE: NO FURTHER SCREENING NEEDED
CLINICAL INTERPRETATION OF PHQ2 SCORE: NO FURTHER SCREENING NEEDED
SUM OF ALL RESPONSES TO PHQ9 QUESTIONS 1 AND 2: 0

## 2021-05-14 RX ORDER — ATORVASTATIN CALCIUM 80 MG/1
TABLET, FILM COATED ORAL
Qty: 30 TABLET | Refills: 0 | Status: SHIPPED | OUTPATIENT
Start: 2021-05-14

## 2021-05-18 ENCOUNTER — TELEPHONE (OUTPATIENT)
Dept: CARDIOLOGY | Age: 46
End: 2021-05-18

## 2021-06-11 RX ORDER — ENALAPRIL MALEATE 5 MG/1
TABLET ORAL
Qty: 90 TABLET | Refills: 3 | Status: SHIPPED | OUTPATIENT
Start: 2021-06-11

## 2021-07-09 ENCOUNTER — TELEPHONE (OUTPATIENT)
Dept: CARDIOLOGY | Age: 46
End: 2021-07-09

## 2021-07-28 ENCOUNTER — TELEPHONE (OUTPATIENT)
Dept: CARDIOLOGY | Age: 46
End: 2021-07-28

## 2021-11-03 ENCOUNTER — HOSPITAL ENCOUNTER (OUTPATIENT)
Dept: GENERAL RADIOLOGY | Facility: HOSPITAL | Age: 46
Discharge: HOME | End: 2021-11-03
Attending: INTERNAL MEDICINE
Payer: COMMERCIAL

## 2021-11-03 ENCOUNTER — LAB ENCOUNTER (OUTPATIENT)
Dept: LAB | Facility: HOSPITAL | Age: 46
End: 2021-11-03
Attending: INTERNAL MEDICINE
Payer: COMMERCIAL

## 2021-11-03 ENCOUNTER — OFFICE VISIT (OUTPATIENT)
Dept: RHEUMATOLOGY | Facility: CLINIC | Age: 46
End: 2021-11-03
Payer: COMMERCIAL

## 2021-11-03 VITALS
BODY MASS INDEX: 31.83 KG/M2 | WEIGHT: 210 LBS | DIASTOLIC BLOOD PRESSURE: 81 MMHG | HEART RATE: 66 BPM | HEIGHT: 68 IN | SYSTOLIC BLOOD PRESSURE: 147 MMHG

## 2021-11-03 DIAGNOSIS — L40.50 PSORIATIC ARTHRITIS (HCC): ICD-10-CM

## 2021-11-03 DIAGNOSIS — L40.9 PSORIASIS: Primary | ICD-10-CM

## 2021-11-03 DIAGNOSIS — L40.9 PSORIASIS: ICD-10-CM

## 2021-11-03 DIAGNOSIS — M54.32 SCIATICA OF LEFT SIDE: ICD-10-CM

## 2021-11-03 PROCEDURE — 85652 RBC SED RATE AUTOMATED: CPT | Performed by: INTERNAL MEDICINE

## 2021-11-03 PROCEDURE — 36415 COLL VENOUS BLD VENIPUNCTURE: CPT

## 2021-11-03 PROCEDURE — 86200 CCP ANTIBODY: CPT | Performed by: INTERNAL MEDICINE

## 2021-11-03 PROCEDURE — 85025 COMPLETE CBC W/AUTO DIFF WBC: CPT | Performed by: INTERNAL MEDICINE

## 2021-11-03 PROCEDURE — 86140 C-REACTIVE PROTEIN: CPT | Performed by: INTERNAL MEDICINE

## 2021-11-03 PROCEDURE — 99204 OFFICE O/P NEW MOD 45 MIN: CPT | Performed by: INTERNAL MEDICINE

## 2021-11-03 PROCEDURE — 86480 TB TEST CELL IMMUN MEASURE: CPT

## 2021-11-03 PROCEDURE — 72110 X-RAY EXAM L-2 SPINE 4/>VWS: CPT | Performed by: INTERNAL MEDICINE

## 2021-11-03 PROCEDURE — 87340 HEPATITIS B SURFACE AG IA: CPT | Performed by: INTERNAL MEDICINE

## 2021-11-03 PROCEDURE — 86706 HEP B SURFACE ANTIBODY: CPT | Performed by: INTERNAL MEDICINE

## 2021-11-03 PROCEDURE — 3079F DIAST BP 80-89 MM HG: CPT | Performed by: INTERNAL MEDICINE

## 2021-11-03 PROCEDURE — 80053 COMPREHEN METABOLIC PANEL: CPT | Performed by: INTERNAL MEDICINE

## 2021-11-03 PROCEDURE — 3008F BODY MASS INDEX DOCD: CPT | Performed by: INTERNAL MEDICINE

## 2021-11-03 PROCEDURE — 73502 X-RAY EXAM HIP UNI 2-3 VIEWS: CPT | Performed by: INTERNAL MEDICINE

## 2021-11-03 PROCEDURE — 86431 RHEUMATOID FACTOR QUANT: CPT | Performed by: INTERNAL MEDICINE

## 2021-11-03 PROCEDURE — 3077F SYST BP >= 140 MM HG: CPT | Performed by: INTERNAL MEDICINE

## 2021-11-03 PROCEDURE — 86803 HEPATITIS C AB TEST: CPT | Performed by: INTERNAL MEDICINE

## 2021-11-03 PROCEDURE — 72202 X-RAY EXAM SI JOINTS 3/> VWS: CPT | Performed by: INTERNAL MEDICINE

## 2021-11-03 PROCEDURE — 86704 HEP B CORE ANTIBODY TOTAL: CPT | Performed by: INTERNAL MEDICINE

## 2021-11-03 RX ORDER — METHYLPREDNISOLONE 4 MG/1
TABLET ORAL
Qty: 1 EACH | Refills: 0 | Status: SHIPPED | OUTPATIENT
Start: 2021-11-03

## 2021-11-03 RX ORDER — ETANERCEPT 50 MG/ML
SOLUTION SUBCUTANEOUS
COMMUNITY
Start: 2021-09-14

## 2021-11-03 NOTE — PATIENT INSTRUCTIONS
You were seen today for psoriasis and psoriatic arthritis  Recommend to continue Enbrel weekly  For your left hip/lower back pain lets get x-rays  Try a Medrol dose pack  Also get blood work today  Follow-up in the next 2 to 3 months

## 2021-11-03 NOTE — PROGRESS NOTES
Jason Menard is a 55year old male who presents for Patient presents with:  Consult  Psoriatic Arthritis  Hip Pain  .    HPI:   CC: PsA  Consult: referred by PCP Dr. Lilli Peng    This is a 54 yo M with hx of HTN, HLD, CAD s/p stents (Dec 2019), Asthma presen 3      Past Medical History:   Diagnosis Date   • Arrhythmia     2 episodes of Vfib 12/20/19   • Coronary atherosclerosis    • Heart attack ([facility] [address]       No past surgical history on file. No family history on file.    Social History:  Social History    [place] pain or swelling or warmth on palpation  Elbow: FROM, no pain or swelling or warmth on palpation  Shoulders: FROM, no pain or swelling or warmth on palpation  Hip: normal log roll, no lateral hip pain, REINA test negative b/l  Knees: FROM, no warmth or eff joints have been controlled on Enbrel.   Also his nails have improved significantly therefore he want to stay on Enbrel  - Blood work today    Left hip/SI joint pain, possible sciatica  - We will obtain x-ray of the left hip, SI joint and lower spine  - Has

## 2021-11-04 ENCOUNTER — LAB ENCOUNTER (OUTPATIENT)
Dept: LAB | Facility: HOSPITAL | Age: 46
End: 2021-11-04
Attending: INTERNAL MEDICINE
Payer: COMMERCIAL

## 2021-11-04 DIAGNOSIS — M54.32 SCIATICA OF LEFT SIDE: ICD-10-CM

## 2021-11-04 DIAGNOSIS — L40.50 PSORIATIC ARTHRITIS (HCC): ICD-10-CM

## 2021-11-04 PROCEDURE — 86812 HLA TYPING A B OR C: CPT

## 2021-11-04 PROCEDURE — 36415 COLL VENOUS BLD VENIPUNCTURE: CPT

## 2021-11-10 ENCOUNTER — TELEPHONE (OUTPATIENT)
Dept: RHEUMATOLOGY | Facility: CLINIC | Age: 46
End: 2021-11-10

## 2021-11-10 ENCOUNTER — LAB ENCOUNTER (OUTPATIENT)
Dept: LAB | Facility: HOSPITAL | Age: 46
End: 2021-11-10
Attending: INTERNAL MEDICINE
Payer: COMMERCIAL

## 2021-11-10 PROCEDURE — 86812 HLA TYPING A B OR C: CPT

## 2021-11-10 PROCEDURE — 81374 HLA I TYPING 1 ANTIGEN LR: CPT

## 2021-11-10 PROCEDURE — 36415 COLL VENOUS BLD VENIPUNCTURE: CPT

## 2021-11-10 NOTE — TELEPHONE ENCOUNTER
Patient stated he received a missed call today from Dr. Adamaris Jara to review his lab results and xrays. Patient is requesting a call back.

## 2021-11-10 NOTE — TELEPHONE ENCOUNTER
Spoke with pt and he stated he have HLAB27 drawn on 11/4. Spoke with lab and it was drawn but not completed, was cancelled. Pt was to be contacted for a redraw. Pt contacted and advised to have lab completed. Pt agreeable with plan.

## 2021-11-12 ENCOUNTER — TELEPHONE (OUTPATIENT)
Dept: RHEUMATOLOGY | Facility: CLINIC | Age: 46
End: 2021-11-12

## 2021-11-12 DIAGNOSIS — M54.32 SCIATICA OF LEFT SIDE: ICD-10-CM

## 2021-11-12 DIAGNOSIS — L40.50 PSORIATIC ARTHRITIS (HCC): Primary | ICD-10-CM

## 2021-11-22 ENCOUNTER — TELEPHONE (OUTPATIENT)
Dept: RHEUMATOLOGY | Facility: CLINIC | Age: 46
End: 2021-11-22

## 2021-11-22 ENCOUNTER — OFFICE VISIT (OUTPATIENT)
Dept: RHEUMATOLOGY | Facility: CLINIC | Age: 46
End: 2021-11-22
Payer: COMMERCIAL

## 2021-11-22 VITALS
HEART RATE: 60 BPM | SYSTOLIC BLOOD PRESSURE: 144 MMHG | HEIGHT: 68 IN | WEIGHT: 226 LBS | BODY MASS INDEX: 34.25 KG/M2 | DIASTOLIC BLOOD PRESSURE: 96 MMHG

## 2021-11-22 DIAGNOSIS — L40.50 PSORIATIC ARTHRITIS (HCC): Primary | ICD-10-CM

## 2021-11-22 DIAGNOSIS — Z51.81 MEDICATION MONITORING ENCOUNTER: ICD-10-CM

## 2021-11-22 DIAGNOSIS — M47.899 HLA-B27 SPONDYLOARTHROPATHY: ICD-10-CM

## 2021-11-22 PROCEDURE — 3008F BODY MASS INDEX DOCD: CPT | Performed by: INTERNAL MEDICINE

## 2021-11-22 PROCEDURE — 99214 OFFICE O/P EST MOD 30 MIN: CPT | Performed by: INTERNAL MEDICINE

## 2021-11-22 PROCEDURE — 3080F DIAST BP >= 90 MM HG: CPT | Performed by: INTERNAL MEDICINE

## 2021-11-22 PROCEDURE — 3077F SYST BP >= 140 MM HG: CPT | Performed by: INTERNAL MEDICINE

## 2021-11-22 RX ORDER — PREDNISONE 10 MG/1
10 TABLET ORAL DAILY
Qty: 30 TABLET | Refills: 0 | Status: SHIPPED | OUTPATIENT
Start: 2021-11-22 | End: 2021-12-14

## 2021-11-22 NOTE — TELEPHONE ENCOUNTER
If you get patient approved for Cosentyx 300 mg loading and maintenance dose. He has psoriasis, psoriatic arthritis and HLA-B27 spondylarthritis. Failed Humira, Simponi and Enbrel.   Cannot take NSAIDs as he is on a blood thinner

## 2021-11-22 NOTE — PATIENT INSTRUCTIONS
You were seen today for psoriatic arthritis that is also affecting the sacroiliac jewel  Try taking prednisone 30-40 mg daily for 2-3 days if you have worsening back pain  Will try to switch to cosentyx  Follow up in 2 mos

## 2021-11-22 NOTE — PROGRESS NOTES
Shirley Serra is a 55year old male. HPI:   Patient presents with: Follow - Up      I had the pleasure of seeing Shirley Serra on 11/22/2021 for follow up PsA.      Current Medications:  Enbrel weekly- since 2020  Past medications:  Humira, Simponi, metho (Active prior to today's visit):  Current Outpatient Medications   Medication Sig Dispense Refill   • ENBREL 50 MG/ML Subcutaneous Solution Prefilled Syringe      • methylPREDNISolone 4 MG Oral Tablet Therapy Pack Take as directed on package.  1 each 0   • lower extremities, sensation intact.   PSYCH: normal mood       LABS:     Component      Latest Ref Rng & Units 11/10/2021 11/3/2021   Quantiferon TB NIL      IU/mL  0.02   Quantiferon-TB1 Minus NIL      IU/mL  0.02   Quantiferon-TB2 Minus NIL      IU/mL  0 blood thinner    Pt will f/u in 2 mos     Eddy Velazquez MD  11/22/2021   8:36 AM

## 2021-11-29 RX ORDER — SECUKINUMAB 150 MG/ML
300 INJECTION SUBCUTANEOUS AS DIRECTED
Qty: 10 EACH | Refills: 0 | Status: CANCELLED | OUTPATIENT
Start: 2021-11-29 | End: 2021-12-27

## 2021-11-29 RX ORDER — SECUKINUMAB 150 MG/ML
300 INJECTION SUBCUTANEOUS
Qty: 2 EACH | Refills: 4 | Status: CANCELLED | OUTPATIENT
Start: 2021-11-29

## 2021-11-29 NOTE — TELEPHONE ENCOUNTER
Cosentyx PA approved 11/29/2021 - 11/29/2022. Order pended. Attempted to contacted patient to inform him of this and offer in office education. No answer. Left generic message to call back. Hire An Esquirehart sent.      Dr. Brice Andrews - Please review order and advise on how

## 2021-11-30 RX ORDER — SECUKINUMAB 150 MG/ML
150 INJECTION SUBCUTANEOUS
Qty: 1 EACH | Refills: 5 | Status: SHIPPED | OUTPATIENT
Start: 2021-11-30

## 2021-11-30 RX ORDER — SECUKINUMAB 150 MG/ML
150 INJECTION SUBCUTANEOUS
Qty: 5 EACH | Refills: 0 | Status: SHIPPED | OUTPATIENT
Start: 2021-11-30 | End: 2022-01-01

## 2021-11-30 NOTE — TELEPHONE ENCOUNTER
Noted. Spoke with Dr. Livier Yang. She would like patient to hold enbrel for 1 week prior to starting cosentyx. Patient informed via [employer]. He stated he does not need teaching appointment.

## 2021-12-02 ENCOUNTER — TELEPHONE (OUTPATIENT)
Dept: RHEUMATOLOGY | Facility: CLINIC | Age: 46
End: 2021-12-02

## 2021-12-02 NOTE — TELEPHONE ENCOUNTER
Karma/ Pharmacy Technician of Chris Keenan is requesting clarification for medication Secukinumab (COSENTYX SENSOREADY PEN) 150 MG/ML Subcutaneous Solution Auto-injector.  Christian Canas states pharmacist needs to know if patient will continue to take

## 2021-12-14 RX ORDER — PREDNISONE 10 MG/1
10 TABLET ORAL DAILY
Qty: 30 TABLET | Refills: 0 | Status: SHIPPED | OUTPATIENT
Start: 2021-12-14

## 2021-12-14 NOTE — TELEPHONE ENCOUNTER
Refill request for prednisone. LOV: 11/22/2021  No future appointments.   LABS:  Component      Latest Ref Rng & Units 11/10/2021 11/3/2021   WBC      4.0 - 11.0 x10(3) uL  5.3   RBC      4.30 - 5.70 x10(6)uL  5.16   Hemoglobin      13.0 - 17.5 g/dL  16 Quantiferon TB NIL      IU/mL  0.02   Quantiferon-TB1 Minus NIL      IU/mL  0.02   Quantiferon-TB2 Minus NIL      IU/mL  0.02   Quantiferon TB Mitogen minus NIL      IU/mL  >10.00   QTB. RESULT      Negative  Negative   HEPATITIS B SURFACE AB QUAL      No

## 2022-01-07 NOTE — TELEPHONE ENCOUNTER
LOV: 11/22/21, pt due for follow up appointment  No future appointments. Labs:   Component      Latest Ref Rng & Units 11/10/2021 11/3/2021   WBC      4.0 - 11.0 x10(3) uL  5.3   RBC      4.30 - 5.70 x10(6)uL  5.16   Hemoglobin      13.0 - 17.5 g/dL  16. 2 Quantiferon TB NIL      IU/mL  0.02   Quantiferon-TB1 Minus NIL      IU/mL  0.02   Quantiferon-TB2 Minus NIL      IU/mL  0.02   Quantiferon TB Mitogen minus NIL      IU/mL  >10.00   QTB. RESULT      Negative  Negative   HEPATITIS B SURFACE AB QUAL      No

## 2022-01-10 RX ORDER — PREDNISONE 10 MG/1
TABLET ORAL
Qty: 30 TABLET | Refills: 0 | OUTPATIENT
Start: 2022-01-10

## 2022-01-10 NOTE — TELEPHONE ENCOUNTER
Called and left a VM to patient. Inform patient per Dr. Luiz Harrington wants to know if he's requesting the prednisone refill? Also he needs to schedule his 2 month follow up appointment. Please call office to help schedule appointment.

## 2022-01-14 ENCOUNTER — TELEPHONE (OUTPATIENT)
Dept: RHEUMATOLOGY | Facility: CLINIC | Age: 47
End: 2022-01-14

## 2022-01-14 NOTE — TELEPHONE ENCOUNTER
Patient has appointment scheduled on 1/17  Future Appointments   Date Time Provider Tamica Mckeon   1/17/2022  2:40 PM Adithya Perez MD [address] [facility]

## 2022-01-14 NOTE — TELEPHONE ENCOUNTER
Spoke to patient, informed that it must have been about scheduling follow-up. Patient states no refill needed at this time.    Future Appointments   Date Time Provider Tamica Mckeon   1/17/2022  2:40 PM Brett Harrison MD 2014 [facility]

## 2022-01-14 NOTE — TELEPHONE ENCOUNTER
Pt states that he received a call from Dr. Davidson  office this morning. Pt is returning the call. No message noted.

## 2022-01-17 ENCOUNTER — LAB ENCOUNTER (OUTPATIENT)
Dept: LAB | Facility: HOSPITAL | Age: 47
End: 2022-01-17
Attending: INTERNAL MEDICINE
Payer: COMMERCIAL

## 2022-01-17 ENCOUNTER — OFFICE VISIT (OUTPATIENT)
Dept: RHEUMATOLOGY | Facility: CLINIC | Age: 47
End: 2022-01-17
Payer: COMMERCIAL

## 2022-01-17 VITALS
BODY MASS INDEX: 32.74 KG/M2 | WEIGHT: 216 LBS | SYSTOLIC BLOOD PRESSURE: 112 MMHG | DIASTOLIC BLOOD PRESSURE: 70 MMHG | HEIGHT: 68 IN | HEART RATE: 61 BPM

## 2022-01-17 DIAGNOSIS — Z51.81 MEDICATION MONITORING ENCOUNTER: ICD-10-CM

## 2022-01-17 DIAGNOSIS — L40.50 PSORIATIC ARTHRITIS (HCC): ICD-10-CM

## 2022-01-17 DIAGNOSIS — M47.899 HLA-B27 SPONDYLOARTHROPATHY: ICD-10-CM

## 2022-01-17 DIAGNOSIS — L40.50 PSORIATIC ARTHRITIS (HCC): Primary | ICD-10-CM

## 2022-01-17 LAB
ALBUMIN SERPL-MCNC: 4 G/DL (ref 3.4–5)
ALT SERPL-CCNC: 36 U/L
AST SERPL-CCNC: 16 U/L (ref 15–37)
BASOPHILS # BLD AUTO: 0.04 X10(3) UL (ref 0–0.2)
BASOPHILS NFR BLD AUTO: 0.7 %
CREAT BLD-MCNC: 1.09 MG/DL
CRP SERPL-MCNC: <0.29 MG/DL (ref ?–0.3)
DEPRECATED RDW RBC AUTO: 43.4 FL (ref 35.1–46.3)
EOSINOPHIL # BLD AUTO: 0.07 X10(3) UL (ref 0–0.7)
EOSINOPHIL NFR BLD AUTO: 1.3 %
ERYTHROCYTE [DISTWIDTH] IN BLOOD BY AUTOMATED COUNT: 12.7 % (ref 11–15)
ERYTHROCYTE [SEDIMENTATION RATE] IN BLOOD: 8 MM/HR
HCT VFR BLD AUTO: 43.1 %
HGB BLD-MCNC: 14.4 G/DL
IMM GRANULOCYTES # BLD AUTO: 0.02 X10(3) UL (ref 0–1)
IMM GRANULOCYTES NFR BLD: 0.4 %
LYMPHOCYTES # BLD AUTO: 2.15 X10(3) UL (ref 1–4)
LYMPHOCYTES NFR BLD AUTO: 39.6 %
MCH RBC QN AUTO: 31 PG (ref 26–34)
MCHC RBC AUTO-ENTMCNC: 33.4 G/DL (ref 31–37)
MCV RBC AUTO: 92.7 FL
MONOCYTES # BLD AUTO: 0.5 X10(3) UL (ref 0.1–1)
MONOCYTES NFR BLD AUTO: 9.2 %
NEUTROPHILS # BLD AUTO: 2.65 X10 (3) UL (ref 1.5–7.7)
NEUTROPHILS # BLD AUTO: 2.65 X10(3) UL (ref 1.5–7.7)
NEUTROPHILS NFR BLD AUTO: 48.8 %
PLATELET # BLD AUTO: 359 10(3)UL (ref 150–450)
RBC # BLD AUTO: 4.65 X10(6)UL
WBC # BLD AUTO: 5.4 X10(3) UL (ref 4–11)

## 2022-01-17 PROCEDURE — 3008F BODY MASS INDEX DOCD: CPT | Performed by: INTERNAL MEDICINE

## 2022-01-17 PROCEDURE — 82040 ASSAY OF SERUM ALBUMIN: CPT

## 2022-01-17 PROCEDURE — 99214 OFFICE O/P EST MOD 30 MIN: CPT | Performed by: INTERNAL MEDICINE

## 2022-01-17 PROCEDURE — 84460 ALANINE AMINO (ALT) (SGPT): CPT

## 2022-01-17 PROCEDURE — 36415 COLL VENOUS BLD VENIPUNCTURE: CPT

## 2022-01-17 PROCEDURE — 85025 COMPLETE CBC W/AUTO DIFF WBC: CPT

## 2022-01-17 PROCEDURE — 3078F DIAST BP <80 MM HG: CPT | Performed by: INTERNAL MEDICINE

## 2022-01-17 PROCEDURE — 84450 TRANSFERASE (AST) (SGOT): CPT

## 2022-01-17 PROCEDURE — 85652 RBC SED RATE AUTOMATED: CPT

## 2022-01-17 PROCEDURE — 3074F SYST BP LT 130 MM HG: CPT | Performed by: INTERNAL MEDICINE

## 2022-01-17 PROCEDURE — 86140 C-REACTIVE PROTEIN: CPT

## 2022-01-17 PROCEDURE — 82565 ASSAY OF CREATININE: CPT

## 2022-01-17 NOTE — PROGRESS NOTES
Herson Pinto is a 55year old male. HPI:   Patient presents with: Follow - Up  Medication Follow-Up      I had the pleasure of seeing Herson Pinto on 1/17/2022 for follow up Seropositive spondyloarthritis/PsA (+HLA-B27, psoriasis and sacroiliitis). weekly injections of Cosentyx for 5 weeks  Reports his back pain is improved significantly.   Not able to work out  Also psoriasis in his hair, right elbow and right knee have resolved        HISTORY:  Past Medical History:   Diagnosis Date   • Arrhythmia FROM  Hands: no synovitis in DIP, PIP and MCP, strong full fists  Wrist: FROM, no pain or swelling or warmth on palpation  Elbow: FROM, no pain or swelling or warmth on palpation  Shoulders: FROM, no pain or swelling or warmth on palpation  Hip: normal log ankylosis.     ASSESSMENT/PLAN:       Psoriatic arthritis with sacroilitis (+HLA-B27 and sacroilitis)- improved  - In the past he was on Humira, Simponi, methotrexate and sulfasalazine  - Blood work showed a positive HLA-B27 and x-ray of the SI joint did sh

## 2022-01-17 NOTE — PATIENT INSTRUCTIONS
Seen today for psoriatic arthritis  On Cosentyx now monthly and doing well  Plan to continue Cosentyx  Blood work today  Follow-up in 6 months

## 2022-04-29 ENCOUNTER — APPOINTMENT (OUTPATIENT)
Dept: CARDIOLOGY | Age: 47
End: 2022-04-29

## 2022-08-15 ENCOUNTER — LAB ENCOUNTER (OUTPATIENT)
Dept: LAB | Facility: HOSPITAL | Age: 47
End: 2022-08-15
Attending: INTERNAL MEDICINE
Payer: COMMERCIAL

## 2022-08-15 ENCOUNTER — TELEPHONE (OUTPATIENT)
Dept: RHEUMATOLOGY | Facility: CLINIC | Age: 47
End: 2022-08-15

## 2022-08-15 ENCOUNTER — OFFICE VISIT (OUTPATIENT)
Dept: RHEUMATOLOGY | Facility: CLINIC | Age: 47
End: 2022-08-15
Payer: COMMERCIAL

## 2022-08-15 VITALS
BODY MASS INDEX: 31.22 KG/M2 | HEIGHT: 68 IN | HEART RATE: 56 BPM | DIASTOLIC BLOOD PRESSURE: 79 MMHG | SYSTOLIC BLOOD PRESSURE: 125 MMHG | WEIGHT: 206 LBS

## 2022-08-15 DIAGNOSIS — Z51.81 MEDICATION MONITORING ENCOUNTER: ICD-10-CM

## 2022-08-15 DIAGNOSIS — L40.50 PSORIATIC ARTHRITIS (HCC): Primary | ICD-10-CM

## 2022-08-15 DIAGNOSIS — M47.899 HLA-B27 SPONDYLOARTHROPATHY: ICD-10-CM

## 2022-08-15 DIAGNOSIS — L40.50 PSORIATIC ARTHRITIS (HCC): ICD-10-CM

## 2022-08-15 LAB
ALBUMIN SERPL-MCNC: 3.9 G/DL (ref 3.4–5)
ALT SERPL-CCNC: 27 U/L
AST SERPL-CCNC: 18 U/L (ref 15–37)
BASOPHILS # BLD AUTO: 0.05 X10(3) UL (ref 0–0.2)
BASOPHILS NFR BLD AUTO: 1.4 %
CREAT BLD-MCNC: 1.18 MG/DL
CRP SERPL-MCNC: <0.29 MG/DL (ref ?–0.3)
DEPRECATED RDW RBC AUTO: 41.6 FL (ref 35.1–46.3)
EOSINOPHIL # BLD AUTO: 0.11 X10(3) UL (ref 0–0.7)
EOSINOPHIL NFR BLD AUTO: 3 %
ERYTHROCYTE [DISTWIDTH] IN BLOOD BY AUTOMATED COUNT: 12.1 % (ref 11–15)
ERYTHROCYTE [SEDIMENTATION RATE] IN BLOOD: 6 MM/HR
GFR SERPLBLD BASED ON 1.73 SQ M-ARVRAT: 77 ML/MIN/1.73M2 (ref 60–?)
HCT VFR BLD AUTO: 42.8 %
HGB BLD-MCNC: 14.8 G/DL
IMM GRANULOCYTES # BLD AUTO: 0.01 X10(3) UL (ref 0–1)
IMM GRANULOCYTES NFR BLD: 0.3 %
LYMPHOCYTES # BLD AUTO: 1.57 X10(3) UL (ref 1–4)
LYMPHOCYTES NFR BLD AUTO: 43 %
MCH RBC QN AUTO: 32.1 PG (ref 26–34)
MCHC RBC AUTO-ENTMCNC: 34.6 G/DL (ref 31–37)
MCV RBC AUTO: 92.8 FL
MONOCYTES # BLD AUTO: 0.31 X10(3) UL (ref 0.1–1)
MONOCYTES NFR BLD AUTO: 8.5 %
NEUTROPHILS # BLD AUTO: 1.6 X10 (3) UL (ref 1.5–7.7)
NEUTROPHILS # BLD AUTO: 1.6 X10(3) UL (ref 1.5–7.7)
NEUTROPHILS NFR BLD AUTO: 43.8 %
PLATELET # BLD AUTO: 280 10(3)UL (ref 150–450)
RBC # BLD AUTO: 4.61 X10(6)UL
WBC # BLD AUTO: 3.7 X10(3) UL (ref 4–11)

## 2022-08-15 PROCEDURE — 86140 C-REACTIVE PROTEIN: CPT | Performed by: INTERNAL MEDICINE

## 2022-08-15 PROCEDURE — 3074F SYST BP LT 130 MM HG: CPT | Performed by: INTERNAL MEDICINE

## 2022-08-15 PROCEDURE — 82565 ASSAY OF CREATININE: CPT

## 2022-08-15 PROCEDURE — 84450 TRANSFERASE (AST) (SGOT): CPT

## 2022-08-15 PROCEDURE — 85652 RBC SED RATE AUTOMATED: CPT | Performed by: INTERNAL MEDICINE

## 2022-08-15 PROCEDURE — 84460 ALANINE AMINO (ALT) (SGPT): CPT

## 2022-08-15 PROCEDURE — 3078F DIAST BP <80 MM HG: CPT | Performed by: INTERNAL MEDICINE

## 2022-08-15 PROCEDURE — 36415 COLL VENOUS BLD VENIPUNCTURE: CPT

## 2022-08-15 PROCEDURE — 85025 COMPLETE CBC W/AUTO DIFF WBC: CPT | Performed by: INTERNAL MEDICINE

## 2022-08-15 PROCEDURE — 3008F BODY MASS INDEX DOCD: CPT | Performed by: INTERNAL MEDICINE

## 2022-08-15 PROCEDURE — 86480 TB TEST CELL IMMUN MEASURE: CPT

## 2022-08-15 PROCEDURE — 99214 OFFICE O/P EST MOD 30 MIN: CPT | Performed by: INTERNAL MEDICINE

## 2022-08-15 PROCEDURE — 82040 ASSAY OF SERUM ALBUMIN: CPT

## 2022-08-15 RX ORDER — SECUKINUMAB 150 MG/ML
150 INJECTION SUBCUTANEOUS
Qty: 1 EACH | Refills: 5 | Status: SHIPPED | OUTPATIENT
Start: 2022-08-15

## 2022-08-16 LAB
M TB IFN-G CD4+ T-CELLS BLD-ACNC: 0 IU/ML
M TB TUBERC IFN-G BLD QL: NEGATIVE
M TB TUBERC IGNF/MITOGEN IGNF CONTROL: >10 IU/ML
QFT TB1 AG MINUS NIL: 0.04 IU/ML
QFT TB2 AG MINUS NIL: 0.07 IU/ML

## 2022-09-21 ENCOUNTER — OFFICE VISIT (OUTPATIENT)
Dept: FAMILY MEDICINE CLINIC | Facility: CLINIC | Age: 47
End: 2022-09-21

## 2022-09-21 VITALS
BODY MASS INDEX: 31.74 KG/M2 | TEMPERATURE: 98 F | SYSTOLIC BLOOD PRESSURE: 114 MMHG | DIASTOLIC BLOOD PRESSURE: 75 MMHG | HEIGHT: 67.72 IN | HEART RATE: 63 BPM | WEIGHT: 207 LBS

## 2022-09-21 DIAGNOSIS — I25.2 HISTORY OF MI (MYOCARDIAL INFARCTION): Primary | ICD-10-CM

## 2022-09-21 DIAGNOSIS — Z12.11 SCREENING FOR COLON CANCER: ICD-10-CM

## 2022-09-21 PROCEDURE — 99203 OFFICE O/P NEW LOW 30 MIN: CPT | Performed by: FAMILY MEDICINE

## 2022-09-21 PROCEDURE — 3078F DIAST BP <80 MM HG: CPT | Performed by: FAMILY MEDICINE

## 2022-09-21 PROCEDURE — 3074F SYST BP LT 130 MM HG: CPT | Performed by: FAMILY MEDICINE

## 2022-09-21 PROCEDURE — 3008F BODY MASS INDEX DOCD: CPT | Performed by: FAMILY MEDICINE

## 2022-09-28 ENCOUNTER — NURSE ONLY (OUTPATIENT)
Dept: GASTROENTEROLOGY | Facility: CLINIC | Age: 47
End: 2022-09-28

## 2022-09-28 DIAGNOSIS — Z12.11 COLON CANCER SCREENING: Primary | ICD-10-CM

## 2022-09-28 NOTE — PROGRESS NOTES
Eliseo Jon patient for a scheduled telephone colon screening. GI MD preference: none  Insurance:  [facility]   CBC from: 8/15/2022  PCP visit on: 9/21/2022     First colonoscopy: yes     Anticoagulants: plavix Govind Pickett MD @ [facility] Cardiology   Diabetic Meds:  No   BP meds(Ace inhibitors/ARB's): ENALAPRIL   Weight loss meds (phentermine/vyvanse): no   Iron supplement (RX/OTC): no   Height & Weight/BMI: 5'7\"/207lbs/31   Hx of Cardiac/CVA issues/(MI/Stroke): MI dec. 2019  Devices Pacemaker/Defibrillator/Stents: 2 stents   Resp. Issues/Oxygen Use/ASHWIN/COPD: no   Issues w/Anesthesia: no     Symptoms (Y/N): no     Family history of colon cancer: no     Medications, pharmacy, and allergies verified with patient over the phone. Please advise on orders and prep, thank you.

## 2022-09-30 NOTE — PROGRESS NOTES
Dx: average risk crc screening  Colonoscopy with MAC sedation  Split dose suprep, if suprep not covered then golytely or moviprep, sent to pharmacy  Please review prep instructions with patient    - HOLD ACE/ARBs the night before and/or the day of the procedure(s) -   - NO herbal supplements or weight loss medications x 7 days prior to the procedure(s)

## 2022-10-06 ENCOUNTER — TELEPHONE (OUTPATIENT)
Dept: GASTROENTEROLOGY | Facility: CLINIC | Age: 47
End: 2022-10-06

## 2022-10-06 NOTE — PROGRESS NOTES
GI clincal-    Please obtain plavix adjustment orders from Dr. Girma Ricketts. Cln scheduled on: 12/22/2022 w/ Dr. Marylin Orosco    May close TE when appropriate. Thank you.

## 2022-10-06 NOTE — TELEPHONE ENCOUNTER
Fax sent to Dr. Karina Carpenter requesting orders for Plavix prior to procedure on 12/22/22. Will await response.

## 2022-11-14 ENCOUNTER — TELEPHONE (OUTPATIENT)
Dept: RHEUMATOLOGY | Facility: CLINIC | Age: 47
End: 2022-11-14

## 2022-12-16 NOTE — TELEPHONE ENCOUNTER
Spoke to Madelin from [facility]. States they received fax per Dr. Rudolph Heredia patient is ok to hold Plavix for 5 days prior. Contacted patient and reviewed instructions above to hold medication. Patient verbalized understanding.

## 2022-12-25 ENCOUNTER — SURGERY CENTER DOCUMENTATION (OUTPATIENT)
Dept: SURGERY | Age: 47
End: 2022-12-25

## 2022-12-30 ENCOUNTER — TELEPHONE (OUTPATIENT)
Facility: CLINIC | Age: 47
End: 2022-12-30

## 2022-12-30 ENCOUNTER — TELEPHONE (OUTPATIENT)
Dept: GASTROENTEROLOGY | Facility: CLINIC | Age: 47
End: 2022-12-30

## 2022-12-31 NOTE — TELEPHONE ENCOUNTER
GI Staff:    Can you please place a recall for this patient to have a colonoscopy repeated in 7 years. Thank you.     Teresita Lake MD

## 2023-01-03 NOTE — TELEPHONE ENCOUNTER
Health maintenance updated. Last colonoscopy done 12/22/22. 7 year recall placed into Pt Outreach, next due on 12/22/29 per Dr. Yao Thomas.

## 2023-04-24 RX ORDER — SECUKINUMAB 150 MG/ML
150 INJECTION SUBCUTANEOUS
Qty: 1 EACH | Refills: 5 | Status: SHIPPED | OUTPATIENT
Start: 2023-04-24

## 2023-05-05 ENCOUNTER — HOSPITAL ENCOUNTER (OUTPATIENT)
Dept: GENERAL RADIOLOGY | Facility: HOSPITAL | Age: 48
Discharge: HOME | End: 2023-05-05
Attending: INTERNAL MEDICINE
Payer: COMMERCIAL

## 2023-05-05 ENCOUNTER — LAB ENCOUNTER (OUTPATIENT)
Dept: LAB | Facility: HOSPITAL | Age: 48
End: 2023-05-05
Attending: INTERNAL MEDICINE
Payer: COMMERCIAL

## 2023-05-05 ENCOUNTER — OFFICE VISIT (OUTPATIENT)
Dept: RHEUMATOLOGY | Facility: CLINIC | Age: 48
End: 2023-05-05

## 2023-05-05 ENCOUNTER — TELEPHONE (OUTPATIENT)
Dept: RHEUMATOLOGY | Facility: CLINIC | Age: 48
End: 2023-05-05

## 2023-05-05 VITALS
DIASTOLIC BLOOD PRESSURE: 78 MMHG | SYSTOLIC BLOOD PRESSURE: 120 MMHG | BODY MASS INDEX: 32.28 KG/M2 | WEIGHT: 213 LBS | HEART RATE: 54 BPM | HEIGHT: 68 IN

## 2023-05-05 DIAGNOSIS — Z51.81 MEDICATION MONITORING ENCOUNTER: ICD-10-CM

## 2023-05-05 DIAGNOSIS — M47.899 HLA-B27 SPONDYLOARTHROPATHY: ICD-10-CM

## 2023-05-05 DIAGNOSIS — L40.50 PSORIATIC ARTHRITIS (HCC): ICD-10-CM

## 2023-05-05 DIAGNOSIS — L40.50 PSORIATIC ARTHRITIS (HCC): Primary | ICD-10-CM

## 2023-05-05 LAB
ALBUMIN SERPL-MCNC: 3.9 G/DL (ref 3.4–5)
ALT SERPL-CCNC: 28 U/L
AST SERPL-CCNC: 19 U/L (ref 15–37)
BASOPHILS # BLD AUTO: 0.05 X10(3) UL (ref 0–0.2)
BASOPHILS NFR BLD AUTO: 1.3 %
CREAT BLD-MCNC: 1.13 MG/DL
CRP SERPL-MCNC: <0.29 MG/DL (ref ?–0.3)
DEPRECATED RDW RBC AUTO: 42.1 FL (ref 35.1–46.3)
EOSINOPHIL # BLD AUTO: 0.09 X10(3) UL (ref 0–0.7)
EOSINOPHIL NFR BLD AUTO: 2.3 %
ERYTHROCYTE [DISTWIDTH] IN BLOOD BY AUTOMATED COUNT: 12.4 % (ref 11–15)
ERYTHROCYTE [SEDIMENTATION RATE] IN BLOOD: 5 MM/HR
GFR SERPLBLD BASED ON 1.73 SQ M-ARVRAT: 81 ML/MIN/1.73M2 (ref 60–?)
HCT VFR BLD AUTO: 44.8 %
HGB BLD-MCNC: 14.9 G/DL
IMM GRANULOCYTES # BLD AUTO: 0.01 X10(3) UL (ref 0–1)
IMM GRANULOCYTES NFR BLD: 0.3 %
LYMPHOCYTES # BLD AUTO: 1.66 X10(3) UL (ref 1–4)
LYMPHOCYTES NFR BLD AUTO: 42.2 %
MCH RBC QN AUTO: 30.7 PG (ref 26–34)
MCHC RBC AUTO-ENTMCNC: 33.3 G/DL (ref 31–37)
MCV RBC AUTO: 92.4 FL
MONOCYTES # BLD AUTO: 0.38 X10(3) UL (ref 0.1–1)
MONOCYTES NFR BLD AUTO: 9.7 %
NEUTROPHILS # BLD AUTO: 1.74 X10 (3) UL (ref 1.5–7.7)
NEUTROPHILS # BLD AUTO: 1.74 X10(3) UL (ref 1.5–7.7)
NEUTROPHILS NFR BLD AUTO: 44.2 %
PLATELET # BLD AUTO: 283 10(3)UL (ref 150–450)
RBC # BLD AUTO: 4.85 X10(6)UL
WBC # BLD AUTO: 3.9 X10(3) UL (ref 4–11)

## 2023-05-05 PROCEDURE — 3008F BODY MASS INDEX DOCD: CPT | Performed by: INTERNAL MEDICINE

## 2023-05-05 PROCEDURE — 99214 OFFICE O/P EST MOD 30 MIN: CPT | Performed by: INTERNAL MEDICINE

## 2023-05-05 PROCEDURE — 85025 COMPLETE CBC W/AUTO DIFF WBC: CPT

## 2023-05-05 PROCEDURE — 84450 TRANSFERASE (AST) (SGOT): CPT

## 2023-05-05 PROCEDURE — 36415 COLL VENOUS BLD VENIPUNCTURE: CPT

## 2023-05-05 PROCEDURE — 72202 X-RAY EXAM SI JOINTS 3/> VWS: CPT | Performed by: INTERNAL MEDICINE

## 2023-05-05 PROCEDURE — 82565 ASSAY OF CREATININE: CPT

## 2023-05-05 PROCEDURE — 3074F SYST BP LT 130 MM HG: CPT | Performed by: INTERNAL MEDICINE

## 2023-05-05 PROCEDURE — 84460 ALANINE AMINO (ALT) (SGPT): CPT

## 2023-05-05 PROCEDURE — 85652 RBC SED RATE AUTOMATED: CPT

## 2023-05-05 PROCEDURE — 86140 C-REACTIVE PROTEIN: CPT

## 2023-05-05 PROCEDURE — 82040 ASSAY OF SERUM ALBUMIN: CPT

## 2023-05-05 PROCEDURE — 3078F DIAST BP <80 MM HG: CPT | Performed by: INTERNAL MEDICINE

## 2023-05-05 RX ORDER — METHYLPREDNISOLONE 4 MG/1
TABLET ORAL
Qty: 1 EACH | Refills: 0 | Status: SHIPPED | OUTPATIENT
Start: 2023-05-05

## 2023-05-05 NOTE — TELEPHONE ENCOUNTER
Prior authorization for: Cosentyx    Medication form: pen     Date submitted: 5/5/23    Submission method:  Support[employer]ee    Tracking #:

## 2023-05-05 NOTE — PATIENT INSTRUCTIONS
You were seen for psoriatic arthritis  Plan to increase Cosentyx to 300 mg monthly, have to get this approved  I sent you a Medrol Dosepak to your pharmacy for now  Blood work today  X-ray  See me in 4 months if symptoms have not improved, if they have you can see me in 1 year

## 2023-05-09 RX ORDER — SECUKINUMAB 150 MG/ML
300 INJECTION SUBCUTANEOUS
Qty: 2 EACH | Refills: 5 | Status: SHIPPED | OUTPATIENT
Start: 2023-05-09 | End: 2023-05-19

## 2023-05-09 NOTE — TELEPHONE ENCOUNTER
Pended medication.      PA Approved    Prior authorization for: Cosentyx 300mg    Medication form: pen [id number]    Date received: 5/9    Approval #:     Approved dates: 5/9/23-5/9/24    Pharmacy for medication: [facility]    QF-TB results: negative 8/15/2022

## 2023-05-12 ENCOUNTER — TELEPHONE (OUTPATIENT)
Dept: RHEUMATOLOGY | Facility: CLINIC | Age: 48
End: 2023-05-12

## 2023-05-12 NOTE — TELEPHONE ENCOUNTER
Called and Spoke with [facility] pharmacist Hillary Bull to inform them Dr. Portia Francis increase medication to Secukinumab, 300 MG Dose, (COSENTYX SENSOREADY, 300 MG,) 150 MG/ML on 5/9/23. Pharmacist verbalize understanding and will update new dose to dispense.

## 2023-05-12 NOTE — TELEPHONE ENCOUNTER
[facility] calling to clarify which script should be filled and which directions to follow    Secukinumab (COSENTYX SENSOREADY PEN) 150 MG/ML Subcutaneous Solution Auto-injector    Secukinumab, 300 MG Dose, (COSENTYX SENSOREADY, 300 MG,) 150 MG/ML Subcutaneous Solution Auto-injector

## 2023-05-18 ENCOUNTER — TELEPHONE (OUTPATIENT)
Dept: RHEUMATOLOGY | Facility: CLINIC | Age: 48
End: 2023-05-18

## 2023-05-18 NOTE — TELEPHONE ENCOUNTER
Patient dropped off financial assistance forms from [email or web address]. He states its for his cosentyx medication. Please complete and call patient when form is completed.   Form in rheum's box

## 2023-05-19 RX ORDER — SECUKINUMAB 150 MG/ML
300 INJECTION SUBCUTANEOUS
Qty: 6 EACH | Refills: 3 | Status: SHIPPED
Start: 2023-05-19

## 2023-11-29 ENCOUNTER — TELEPHONE (OUTPATIENT)
Dept: RHEUMATOLOGY | Facility: CLINIC | Age: 48
End: 2023-11-29

## 2023-11-29 NOTE — TELEPHONE ENCOUNTER
Pt dropped off [facility] patient assistance paperwork for further completion regarding Cosentyx 300 mg pen. Asked once completed for a phone call for . Completed, placed on providers desk for further review and signature.

## 2023-11-29 NOTE — TELEPHONE ENCOUNTER
Called pt back, LVM to call back. [facility] also sent. Paperwork left at  for pt pickup.

## 2023-12-06 ENCOUNTER — LAB ENCOUNTER (OUTPATIENT)
Dept: LAB | Facility: HOSPITAL | Age: 48
End: 2023-12-06
Attending: INTERNAL MEDICINE
Payer: COMMERCIAL

## 2023-12-06 DIAGNOSIS — E78.5 HYPERLIPIDEMIA, UNSPECIFIED: ICD-10-CM

## 2023-12-06 DIAGNOSIS — I21.3 STEMI (ST ELEVATION MYOCARDIAL INFARCTION) (HCC): ICD-10-CM

## 2023-12-06 DIAGNOSIS — I25.10 CORONARY ARTERY DISEASE INVOLVING NATIVE CORONARY ARTERY OF NATIVE HEART WITHOUT ANGINA PECTORIS: Primary | ICD-10-CM

## 2023-12-06 LAB
ALBUMIN SERPL-MCNC: 4.5 G/DL (ref 3.2–4.8)
ALBUMIN/GLOB SERPL: 1.6 {RATIO} (ref 1–2)
ALP LIVER SERPL-CCNC: 93 U/L
ALT SERPL-CCNC: 21 U/L
ANION GAP SERPL CALC-SCNC: 5 MMOL/L (ref 0–18)
AST SERPL-CCNC: 18 U/L (ref ?–34)
BILIRUB SERPL-MCNC: 0.5 MG/DL (ref 0.3–1.2)
BUN BLD-MCNC: 13 MG/DL (ref 9–23)
BUN/CREAT SERPL: 10.6 (ref 10–20)
CALCIUM BLD-MCNC: 10 MG/DL (ref 8.7–10.4)
CHLORIDE SERPL-SCNC: 104 MMOL/L (ref 98–112)
CHOLEST SERPL-MCNC: 216 MG/DL (ref ?–200)
CK SERPL-CCNC: 127 U/L
CO2 SERPL-SCNC: 28 MMOL/L (ref 21–32)
CREAT BLD-MCNC: 1.23 MG/DL
EGFRCR SERPLBLD CKD-EPI 2021: 72 ML/MIN/1.73M2 (ref 60–?)
FASTING PATIENT LIPID ANSWER: YES
FASTING STATUS PATIENT QL REPORTED: YES
GLOBULIN PLAS-MCNC: 2.9 G/DL (ref 2.8–4.4)
GLUCOSE BLD-MCNC: 89 MG/DL (ref 70–99)
HDLC SERPL-MCNC: 48 MG/DL (ref 40–59)
LDLC SERPL CALC-MCNC: 140 MG/DL (ref ?–100)
NONHDLC SERPL-MCNC: 168 MG/DL (ref ?–130)
OSMOLALITY SERPL CALC.SUM OF ELEC: 284 MOSM/KG (ref 275–295)
POTASSIUM SERPL-SCNC: 4.5 MMOL/L (ref 3.5–5.1)
PROT SERPL-MCNC: 7.4 G/DL (ref 5.7–8.2)
SODIUM SERPL-SCNC: 137 MMOL/L (ref 136–145)
TRIGL SERPL-MCNC: 154 MG/DL (ref 30–149)
VLDLC SERPL CALC-MCNC: 29 MG/DL (ref 0–30)

## 2023-12-06 PROCEDURE — 36415 COLL VENOUS BLD VENIPUNCTURE: CPT

## 2023-12-06 PROCEDURE — 80061 LIPID PANEL: CPT

## 2023-12-06 PROCEDURE — 82550 ASSAY OF CK (CPK): CPT

## 2023-12-06 PROCEDURE — 80053 COMPREHEN METABOLIC PANEL: CPT

## 2024-03-04 ENCOUNTER — TELEPHONE (OUTPATIENT)
Dept: RHEUMATOLOGY | Facility: CLINIC | Age: 49
End: 2024-03-04

## 2024-03-04 NOTE — TELEPHONE ENCOUNTER
Returned call. Got transferred to [facility] with [employer] [facility] [employer]. Then transferred to Valerie Pharmacist with [facility] (formerly [facility]). Verbal order for Cosentyx given: Cosentyx 150mg pen Sig: Inject 300mg subcutaneous every 28 days. Dispense Quantity 2 pens. Refills # 5.

## 2024-03-04 NOTE — TELEPHONE ENCOUNTER
Leanne with [facility] is requesting clarification for patients cosentyx prescription. Per Leanne, the prescription does not have the medication quantity or refills included.  New prescription can be send electronically or faxed to [phone]      [facility] - [place], [place] - [address]      Medication Quantity Refills Start End   Secukinumab, 300 MG Dose, (COSENTYX SENSOREADY, 300 MG,) 150 MG/ML Subcutaneous Solution Auto-injector 6 each 3 5/19/2023 --   Sig:   Inject 300 mg into the skin every 28 days.     Route:   Subcutaneous     Class:   Fax     Order #:   [id number]

## 2024-05-31 ENCOUNTER — LAB ENCOUNTER (OUTPATIENT)
Dept: LAB | Facility: HOSPITAL | Age: 49
End: 2024-05-31
Attending: INTERNAL MEDICINE
Payer: COMMERCIAL

## 2024-05-31 DIAGNOSIS — E78.5 HYPERLIPEMIA: ICD-10-CM

## 2024-05-31 DIAGNOSIS — I25.10 CORONARY ATHEROSCLEROSIS OF NATIVE CORONARY ARTERY: Primary | ICD-10-CM

## 2024-05-31 LAB
ALBUMIN SERPL-MCNC: 4.8 G/DL (ref 3.2–4.8)
ALBUMIN/GLOB SERPL: 1.9 {RATIO} (ref 1–2)
ALP LIVER SERPL-CCNC: 93 U/L
ALT SERPL-CCNC: 22 U/L
ANION GAP SERPL CALC-SCNC: 7 MMOL/L (ref 0–18)
AST SERPL-CCNC: 29 U/L (ref ?–34)
BILIRUB SERPL-MCNC: 0.6 MG/DL (ref 0.3–1.2)
BUN BLD-MCNC: 16 MG/DL (ref 9–23)
BUN/CREAT SERPL: 14.2 (ref 10–20)
CALCIUM BLD-MCNC: 9.8 MG/DL (ref 8.7–10.4)
CHLORIDE SERPL-SCNC: 106 MMOL/L (ref 98–112)
CHOLEST SERPL-MCNC: 157 MG/DL (ref ?–200)
CK SERPL-CCNC: 124 U/L
CO2 SERPL-SCNC: 28 MMOL/L (ref 21–32)
CREAT BLD-MCNC: 1.13 MG/DL
EGFRCR SERPLBLD CKD-EPI 2021: 80 ML/MIN/1.73M2 (ref 60–?)
FASTING PATIENT LIPID ANSWER: YES
FASTING STATUS PATIENT QL REPORTED: YES
GLOBULIN PLAS-MCNC: 2.5 G/DL (ref 2–3.5)
GLUCOSE BLD-MCNC: 99 MG/DL (ref 70–99)
HDLC SERPL-MCNC: 59 MG/DL (ref 40–59)
LDLC SERPL CALC-MCNC: 89 MG/DL (ref ?–100)
NONHDLC SERPL-MCNC: 98 MG/DL (ref ?–130)
OSMOLALITY SERPL CALC.SUM OF ELEC: 293 MOSM/KG (ref 275–295)
POTASSIUM SERPL-SCNC: 4.4 MMOL/L (ref 3.5–5.1)
PROT SERPL-MCNC: 7.3 G/DL (ref 5.7–8.2)
SODIUM SERPL-SCNC: 141 MMOL/L (ref 136–145)
TRIGL SERPL-MCNC: 43 MG/DL (ref 30–149)
VLDLC SERPL CALC-MCNC: 7 MG/DL (ref 0–30)

## 2024-05-31 PROCEDURE — 80053 COMPREHEN METABOLIC PANEL: CPT

## 2024-05-31 PROCEDURE — 80061 LIPID PANEL: CPT

## 2024-05-31 PROCEDURE — 36415 COLL VENOUS BLD VENIPUNCTURE: CPT

## 2024-05-31 PROCEDURE — 82550 ASSAY OF CK (CPK): CPT

## 2024-06-18 ENCOUNTER — NURSE TRIAGE (OUTPATIENT)
Dept: FAMILY MEDICINE CLINIC | Facility: CLINIC | Age: 49
End: 2024-06-18

## 2024-06-18 NOTE — TELEPHONE ENCOUNTER
Please reply to pool: EM RN TRIAGE  Action Requested: Summary for Provider     []  Critical Lab, Recommendations Needed  [] Need Additional Advice  [x]   FYI    []   Need Orders  [] Need Medications Sent to Pharmacy  []  Other     SUMMARY: Patient contacts clinic to reschedule appointment for today.  He reports pain in his upper and lower back with tingling to his extremities.  He is not currently experiencing the pain. The pain comes and goes over the last 1.5 months.  Occasional chest pain and shortness of breath.  He feels tired.  He states he works through his symptoms and they resolve with rest.  No known trauma.  Denies headache or blurred vision.  Denies extremity weakness.  Nurse advised against rescheduling.  He states he cannot come in this week due to work.  Nurse rescheduled acute visit for 06/25, advised to call back for any symptom recurrence.  Emergency room for any chest pain or shortness of breath.  He verbalized understanding and compliance.     Reason for call: Back Pain  Onset: Data Unavailable                       Reason for Disposition   Numbness in an arm or hand (i.e., loss of sensation) and upper back pain   Numbness in a leg or foot (i.e., loss of sensation)    Protocols used: Back Pain-A-[place]

## 2024-06-18 NOTE — TELEPHONE ENCOUNTER
Future Appointments   Date Time Provider Department Center   6/25/2024 10:30 AM Alexandria Robbins APRN [facility]

## 2024-06-25 ENCOUNTER — OFFICE VISIT (OUTPATIENT)
Dept: FAMILY MEDICINE CLINIC | Facility: CLINIC | Age: 49
End: 2024-06-25

## 2024-06-25 VITALS
OXYGEN SATURATION: 99 % | DIASTOLIC BLOOD PRESSURE: 72 MMHG | HEIGHT: 68 IN | HEART RATE: 66 BPM | SYSTOLIC BLOOD PRESSURE: 110 MMHG | TEMPERATURE: 97 F | BODY MASS INDEX: 32.13 KG/M2 | WEIGHT: 212 LBS

## 2024-06-25 DIAGNOSIS — L40.50 PSORIATIC ARTHRITIS (HCC): ICD-10-CM

## 2024-06-25 DIAGNOSIS — M25.552 BILATERAL HIP PAIN: ICD-10-CM

## 2024-06-25 DIAGNOSIS — I25.2 HISTORY OF MI (MYOCARDIAL INFARCTION): ICD-10-CM

## 2024-06-25 DIAGNOSIS — G89.29 CHRONIC BILATERAL LOW BACK PAIN WITHOUT SCIATICA: Primary | ICD-10-CM

## 2024-06-25 DIAGNOSIS — M54.50 CHRONIC BILATERAL LOW BACK PAIN WITHOUT SCIATICA: Primary | ICD-10-CM

## 2024-06-25 DIAGNOSIS — M25.551 BILATERAL HIP PAIN: ICD-10-CM

## 2024-06-25 DIAGNOSIS — M54.10 RADICULOPATHY, UNSPECIFIED SPINAL REGION: ICD-10-CM

## 2024-06-25 DIAGNOSIS — G56.03 BILATERAL CARPAL TUNNEL SYNDROME: ICD-10-CM

## 2024-06-25 PROCEDURE — 99214 OFFICE O/P EST MOD 30 MIN: CPT

## 2024-06-25 PROCEDURE — G2211 COMPLEX E/M VISIT ADD ON: HCPCS

## 2024-06-25 RX ORDER — EZETIMIBE 10 MG/1
10 TABLET ORAL EVERY EVENING
COMMUNITY
Start: 2024-06-18

## 2024-06-25 RX ORDER — ASPIRIN 81 MG/1
81 TABLET ORAL DAILY
COMMUNITY

## 2024-06-25 RX ORDER — CLOPIDOGREL BISULFATE 75 MG/1
75 TABLET ORAL DAILY
COMMUNITY
Start: 2024-05-20

## 2024-06-25 RX ORDER — METHYLPREDNISOLONE 4 MG/1
TABLET ORAL
Qty: 21 EACH | Refills: 0 | Status: SHIPPED | OUTPATIENT
Start: 2024-06-25

## 2024-06-25 NOTE — PROGRESS NOTES
Donald Sloan is a 48 year old male.  Chief Complaint   Patient presents with    Pain     Pain to left hip, legs and lower back for over a year.Pt states pain comes and goes, pt works for [employer] and lift and pulls and bends.     HPI:   Donald Sloan presented to the clinic for follow-up chronic low back pain, bilateral hip pain times several years.  Associated radiation to the bilateral extremities.  Denies loss of bowel or bladder function.  Pain improves with \"hunched over position\".  Worse with prolonged standing or sitting.  Heavy lifting at work.  No known trauma.  Patient follows with rheumatology-Dr. Whitley.  No recent physical therapy.  No previous CSI.  Imaging completed last 5/5/23 SI joint and 2021 lumbar spine, XR hip.      Additional concern related to numbness tingling to the bilateral hands.  Worse at night.  Associated weakness.     Current Outpatient Medications   Medication Sig Dispense Refill    clopidogrel 75 MG Oral Tab Take 1 tablet (75 mg total) by mouth daily.      ezetimibe 10 MG Oral Tab Take 1 tablet (10 mg total) by mouth every evening.      aspirin 81 MG Oral Tab EC Take 1 tablet (81 mg total) by mouth daily.      Secukinumab, 300 MG Dose, (COSENTYX SENSOREADY, 300 MG,) 150 MG/ML Subcutaneous Solution Auto-injector Inject 300 mg into the skin every 28 days. 6 each 3    predniSONE 10 MG Oral Tab Take 1 tablet (10 mg total) by mouth daily. 30 tablet 0    atorvastatin 80 MG Oral Tab Take 1 tablet (80 mg total) by mouth nightly. 30 tablet 3    Enalapril Maleate 5 MG Oral Tab Take 1 tablet (5 mg total) by mouth 2 (two) times daily. 60 tablet 3    spironolactone 25 MG Oral Tab Take 1 tablet (25 mg total) by mouth daily. 30 tablet 3    Metoprolol Succinate ER 25 MG Oral Tablet 24 Hr Take 1 tablet (25 mg total) by mouth daily. 30 tablet 3    rivaroxaban (XARELTO) 20 MG Oral Tab Take 1 tablet (20 mg total) by mouth daily. (Patient not taking: Reported on 6/25/2024)      methylPREDNISolone  4 MG Oral Tablet Therapy Pack Take as directed on package. (Patient not taking: Reported on 2024) 1 each 0    polyethylene glycol, PEG 3350-KCl-NaBcb-NaCl-NaSulf, 236 g Oral Recon Soln Take 4,000 mL by mouth As Directed. Take 2,000 mL the night before your procedure and 2,000 mL the morning of your procedure. (Patient not taking: Reported on 2024) 1 each 0      Past Medical History:    Arrhythmia    2 episodes of Vfib 19    Coronary atherosclerosis    Heart attack (HCC)    Psoriasis    Psoriatic arthritis (HCC)      History reviewed. No pertinent surgical history.   Social History:  Social History     Socioeconomic History    Marital status:    Tobacco Use    Smoking status: Former     Current packs/day: 0.00     Average packs/day: 0.5 packs/day for 16.0 years (8.0 ttl pk-yrs)     Types: Cigarettes, Cigars     Start date: 2003     Quit date: 2019     Years since quittin.5    Smokeless tobacco: Never   Vaping Use    Vaping status: Never Used   Substance and Sexual Activity    Alcohol use: Yes     Alcohol/week: 6.0 standard drinks of alcohol     Types: 6 Cans of beer per week    Drug use: Never      Family History   Problem Relation Age of Onset    Diabetes Maternal Grandmother     Other (Other) Sister 51        blood clots      No Known Allergies     REVIEW OF SYSTEMS:   Review of Systems   Constitutional: Negative.    Respiratory: Negative.  Negative for shortness of breath.    Cardiovascular: Negative.  Negative for chest pain and palpitations.   Musculoskeletal:  Positive for arthralgias, back pain and myalgias.   Neurological:  Positive for numbness. Negative for dizziness, weakness and headaches.   Psychiatric/Behavioral: Negative.     All other systems reviewed and are negative.     Wt Readings from Last 5 Encounters:   24 212 lb (96.2 kg)   23 213 lb (96.6 kg)   22 207 lb (93.9 kg)   08/15/22 206 lb (93.4 kg)   22 216 lb (98 kg)     Body mass index  is 32.23 kg/m².      EXAM:   /72 (BP Location: Right arm, Patient Position: Sitting, Cuff Size: adult)   Pulse 66   Temp 97.1 °F (36.2 °C) (Temporal)   Ht 5' 8\" (1.727 m)   Wt 212 lb (96.2 kg)   SpO2 99%   BMI 32.23 kg/m²   Physical Exam  Vitals and nursing note reviewed.   Constitutional:       Appearance: Normal appearance.   HENT:      Head: Normocephalic and atraumatic.   Cardiovascular:      Rate and Rhythm: Normal rate and regular rhythm.      Pulses: Normal pulses.      Heart sounds: Normal heart sounds.   Pulmonary:      Effort: Pulmonary effort is normal.      Breath sounds: Normal breath sounds.   Musculoskeletal:      Cervical back: No spasms or tenderness. No pain with movement. Normal range of motion.      Thoracic back: Tenderness present. Decreased range of motion.      Lumbar back: Tenderness present. No spasms. Decreased range of motion. Negative right straight leg raise test and negative left straight leg raise test.      Comments: Positive Tinel's sign  Positive Phalen sign  Positive Patrick (left).   Neurological:      General: No focal deficit present.      Mental Status: He is alert and oriented to person, place, and time.   Psychiatric:         Mood and Affect: Mood normal.         Behavior: Behavior normal.            ASSESSMENT AND PLAN:   (M54.50,  G89.29) Chronic bilateral low back pain without sciatica  (primary encounter diagnosis)  (M54.10) Radiculopathy, unspecified spinal region  Plan: methylPREDNISolone (MEDROL) 4 MG Oral Tablet         Therapy Pack  Patient with chronic low back pain.  Negative straight leg.  Patient complains of intermittent radiculopathy to the bilateral lower extremities.  X-ray completed 2021.  Denies red flag symptoms.  No recent PT or CSI.  Advised Medrol Dosepak-prescription to pharmacy.  Gentle stretch.  Ice.  Heat.  Physical therapy.  Referral placed.  If no improvement follow-up with rheum.    (M25.551,  M25.552) Bilateral hip pain  Plan:  methylPREDNISolone (MEDROL) 4 MG Oral Tablet         Therapy Pack  Patient with complaint of bilateral hip pain.  Left greater than right.  X-ray completed SI joint 2023.  Positive REINA test.  Advised gentle stretch.  Physical therapy.  Medrol Dosepak to pharmacy.  If no improvement follow-up with rheumatology.    (G56.03) Bilateral carpal tunnel syndrome  Plan: methylPREDNISolone (MEDROL) 4 MG Oral Tablet         Therapy Pack  Patient with complaint of bilateral numbness tingling to the bilateral hands.  Worse at night.  Associated weakness.  Frequently shakes out hands.  HPI and examination consistent with carpal tunnel.  Advised braces.  Frequent breaks from typing or heavy lifting.  Prescription for Medrol Dosepak to pharmacy.  No improvement will refer to hand specialty.    (L40.50) Psoriatic arthritis (HCC)  Plan: Follows with rheumatology.  Take medication as prescribed.  Continue to follow with rheum.  Reviewed note 2023.    (I25.2) History of MI (myocardial infarction)  Plan: Patient following with cardiology.- Dr. Flores. Reviewed labs 5/2024. Continue to follow with specialist.     Follow up as needed     The patient indicates understanding of these issues and agrees to the plan.  Chaperone offered to the patient prior to examination    This note was prepared using Dragon Medical voice recognition dictation software. As a result errors may occur. When identified these errors have been corrected. While every attempt is made to correct errors during dictation discrepancies may still exist.

## 2024-07-12 ENCOUNTER — OFFICE VISIT (OUTPATIENT)
Dept: RHEUMATOLOGY | Facility: CLINIC | Age: 49
End: 2024-07-12
Payer: COMMERCIAL

## 2024-07-12 VITALS
HEART RATE: 62 BPM | BODY MASS INDEX: 33.19 KG/M2 | SYSTOLIC BLOOD PRESSURE: 111 MMHG | DIASTOLIC BLOOD PRESSURE: 69 MMHG | WEIGHT: 219 LBS | HEIGHT: 68 IN

## 2024-07-12 DIAGNOSIS — L40.50 PSORIATIC ARTHRITIS (HCC): Primary | ICD-10-CM

## 2024-07-12 DIAGNOSIS — Z51.81 MEDICATION MONITORING ENCOUNTER: ICD-10-CM

## 2024-07-12 DIAGNOSIS — M47.899 HLA-B27 SPONDYLOARTHROPATHY: ICD-10-CM

## 2024-07-12 PROCEDURE — G2211 COMPLEX E/M VISIT ADD ON: HCPCS | Performed by: INTERNAL MEDICINE

## 2024-07-12 PROCEDURE — 99214 OFFICE O/P EST MOD 30 MIN: CPT | Performed by: INTERNAL MEDICINE

## 2024-07-12 RX ORDER — SECUKINUMAB 150 MG/ML
300 INJECTION SUBCUTANEOUS
Qty: 6 EACH | Refills: 3 | Status: SHIPPED | OUTPATIENT
Start: 2024-07-12

## 2024-07-12 NOTE — PROGRESS NOTES
Donald Sloan is a 48 year old male.    HPI:     Chief Complaint   Patient presents with    Psoriatic Arthritis       I had the pleasure of seeing Donald Sloan on 7/12/2024 for follow up Seropositive spondyloarthritis/PsA (+HLA-B27, psoriasis and sacroiliitis).     Current Medications:  Cosentyx 300 mg monthly- started 12/2021  Past medications:  Humira, Simponi, methotrexate and sulfasalazine  Enbrel weekly- since 2020- 12/2021  Blood work:  Neg ESR, CRP, RF, CCP  +HLA-B27  XR SI joint: Erosions are seen about the right sacroiliac joint with associated subchondral sclerosis.  There is mild subchondral sclerosis about the left sacroiliac joint, possibly with milder erosions    Interval History:  This is a 47 yo M with hx of HTN, HLD, CAD s/p stents (Dec 2019), Asthma presents to [facility] care.  Is from rheumatologist retired.  He has had psoriasis since his 20s.  Also reports joint pain involving his hands, wrists, shoulders, knees, ankles and feet.  Reports intermittent swelling.  In the past he was on Humira, Simponi, methotrexate and sulfasalazine.  He had an MI in 2019 and therefore was off medication about a year and a half.  Skin got worse.  He reports having psoriasis throughout his whole body and even in his nails.  Currently has been on Enbrel for the past year.  Psoriasis has improved and so has his joints.  Over the past month he has had worsening left hip/lower back pain.  It starts in the left SI joint going down his knee.  Worse with walking.  Continues to have some psoriasis patches in his hair, minimal on the elbows and right knee.    11/22/2021:  Patient presents for f/u of PsA  Continues to complain of pain in his left SI joint, at times is hard for him to walk  Blood work did show + HLA-B27 and evidence of sacroiliitis  Denies any other joint pain or swelling.  Continues to have some active psoriasis on his elbows but very minimal    1/17/2022:  Patient presents for f/u of PsA  Patient has  finished weekly injections of Cosentyx for 5 weeks  Reports his back pain is improved significantly.  Not able to work out  Also psoriasis in his hair, right elbow and right knee have resolved    8/15/2022:  Patient presents for f/u of PsA  Was on Cosentyx 150 mg monthly but off for last 2 mos as he ran out  Now getting pain in L SI joint region/hip region  Gets some numbness and tingling in arms  Having some r shoulder pain, never felt it on the Cosentyx  Psoriasis is better    5/5/2023:  Patient presents for f/u of PsA  On Cosentyx 150 mg monthly  Having a lot of pain in R SI joint, hip region. Pain can go up to a 9, similar pain as before. Cosentyx was working well. Symptoms worsened over past 3 mos   Will take tylenol arthritis not daily, does help at times  Some pain in hands but not severe  No swelling in the joints  No psoriasis     7/12/2024:  Patient presents for f/u of PsA  On Cosentyx 300 mg monthly  Last visit Cosentyx was increased and back pain has improved  Has been having n/t in the hands samantha at night. Was given prednisone for 5 days and helped a little  Back pain stable  Skin is stable, no active psoriasis           HISTORY:  Past Medical History:    Arrhythmia    2 episodes of Vfib 12/20/19    Coronary atherosclerosis    Heart attack (HCC)    Psoriasis    Psoriatic arthritis (HCC)      Social Hx Reviewed   Family Hx Reviewed     Medications (Active prior to today's visit):  Current Outpatient Medications   Medication Sig Dispense Refill    clopidogrel 75 MG Oral Tab Take 1 tablet (75 mg total) by mouth daily.      aspirin 81 MG Oral Tab EC Take 1 tablet (81 mg total) by mouth daily.      Secukinumab, 300 MG Dose, (COSENTYX SENSOREADY, 300 MG,) 150 MG/ML Subcutaneous Solution Auto-injector Inject 300 mg into the skin every 28 days. 6 each 3    atorvastatin 80 MG Oral Tab Take 1 tablet (80 mg total) by mouth nightly. 30 tablet 3    Enalapril Maleate 5 MG Oral Tab Take 1 tablet (5 mg total) by mouth 2  (two) times daily. 60 tablet 3    spironolactone 25 MG Oral Tab Take 1 tablet (25 mg total) by mouth daily. 30 tablet 3    Metoprolol Succinate ER 25 MG Oral Tablet 24 Hr Take 1 tablet (25 mg total) by mouth daily. 30 tablet 3    ezetimibe 10 MG Oral Tab Take 1 tablet (10 mg total) by mouth every evening.      methylPREDNISolone (MEDROL) 4 MG Oral Tablet Therapy Pack As directed. (Patient not taking: Reported on 7/12/2024) 21 each 0     .cmed  Allergies:  No Known Allergies      ROS:   All other ROS are negative.     PHYSICAL EXAM:   GEN: AAOx3, NAD  HEENT: EOMI, PERRLA, no injection or icterus, oral mucosa moist, no oral lesions. No lymphadenopathy. No facial rash  CVS: RRR, no murmurs rubs or gallops. Equal 2+ distal pulses.   LUNGS: CTAB, no increased work of breathing  ABDOMEN:  soft NT/ND, +BS, no HSM  SKIN: small psoriatic patch on scalp  MSK:  Cervical spine: FROM  Hands: no synovitis in DIP, PIP and MCP, strong full fists  Wrist: FROM, no pain or swelling or warmth on palpation  Elbow: FROM, no pain or swelling or warmth on palpation  Shoulders: FROM, no pain or swelling or warmth on palpation  Hip: normal log roll, no lateral hip pain, REINA test negative b/l  Knees: FROM, no warmth or effusion present. No pain with ROM.   Ankles: FROM, no pain or swelling or warmth on palpation  Feet: no pain with MTP squeeze, no toe swelling or pain or warmth on palpation with FROM  Spine: L SI joint TTP  NEURO: Cranial nerves II-XII intact grossly. 5/5 strength throughout in both upper and lower extremities, sensation intact.  PSYCH: normal mood       LABS:     Component      Latest Ref Rng & Units 11/10/2021 11/3/2021   Quantiferon TB NIL      IU/mL  0.02   Quantiferon-TB1 Minus NIL      IU/mL  0.02   Quantiferon-TB2 Minus NIL      IU/mL  0.02   Quantiferon TB Mitogen minus NIL      IU/mL  >10.00   QTB.RESULT      Negative  Negative   HEPATITIS B SURFACE AB QUAL      Nonreactive   Nonreactive   HEPATITIS B SURFACE AB  QUANT      mIU/mL  <3.10   HBSAg Screen      Nonreactive   Nonreactive   Hbsag Screen Index        <0.10   Ankylosing Spondylitis (HLAB27) Specimen       Whole Blood    Ankylosing Spondylitis (HLAB27)       Positive (A)    C-REACTIVE PROTEIN      <0.30 mg/dL  <0.29   C-Citrullinated Peptide IgG AB      0.0 - 6.9 U/mL  1.5   SED RATE      0 - 15 mm/Hr  15   RHEUMATOID FACTOR      <15 IU/mL  <10   HEPATITIS B CORE AB, TOTAL      Nonreactive   Nonreactive   HCV AB      Nonreactive   Nonreactive   HLA-B27      Negative See Note      Imaging:     XR SI joint:  BONES: No evidence of recent fracture, dislocation, or destructive lesions.     JOINTS: Erosions are seen about the right sacroiliac joint with associated subchondral sclerosis.  There is mild subchondral sclerosis about the left sacroiliac joint, possibly with milder erosions.  No abnormal SI joint widening or ankylosis.    ASSESSMENT/PLAN:       Psoriatic arthritis with sacroilitis (+HLA-B27 and sacroilitis)- stable  - In the past he was on Humira, Simponi, methotrexate and sulfasalazine  - Blood work showed a positive HLA-B27 and x-ray of the SI joint did show erosions and sacroiliitis  - Joints are stable  - Continue Cosentyx 300 mg monthly, doing well   - Blood work in May 2024 showed normal CMP    Psoriasis  - stable on Cosentyx     Pt will f/u yearly    There is a longitudinal care relationship with me, the care plan reflects the ongoing nature of the continuous relationship of care, and the medical record indicates that there is ongoing treatment of a serious/complex medical condition which I am currently managing.  is Applicable.     Melanie Whitley MD  7/12/2024  12:00 PM

## 2024-07-16 ENCOUNTER — TELEPHONE (OUTPATIENT)
Dept: RHEUMATOLOGY | Facility: CLINIC | Age: 49
End: 2024-07-16

## 2024-07-16 DIAGNOSIS — L40.50 PSORIATIC ARTHRITIS (HCC): ICD-10-CM

## 2024-07-16 DIAGNOSIS — M47.899 HLA-B27 SPONDYLOARTHROPATHY: ICD-10-CM

## 2024-07-16 DIAGNOSIS — Z51.81 MEDICATION MONITORING ENCOUNTER: ICD-10-CM

## 2024-07-16 NOTE — TELEPHONE ENCOUNTER
PA start  reauthorization    Prior authorization for: Cosentyx    Medication form: 2- 150 mg pen    Submission method: Fax to [facility]    Spoke with (if by phone):     Date submitted: 7/16/2024    Tracking #: [id number]    QF-TB result: ordered 7/12/2024

## 2024-07-16 NOTE — TELEPHONE ENCOUNTER
Patient's wife called to inform that the medication  Cosentyx should not be sent to [facility] but to [facility] which is a patient assistance program pharmacy. Their fax number is [phone]. Wife did not know that address or what pharmacy. Only knew the name and fax number. The only [facility] address found in system is the bottom one. Please advise.       PHARMACY SERVICES AT [facility] [phone], [phone]

## 2024-07-17 RX ORDER — SECUKINUMAB 150 MG/ML
300 INJECTION SUBCUTANEOUS
Qty: 6 EACH | Refills: 3 | Status: SHIPPED | OUTPATIENT
Start: 2024-07-17

## 2024-07-18 NOTE — TELEPHONE ENCOUNTER
PA Approved    Prior authorization for: Cosentyx    Medication form: 150 mg (2)    Date received: 7/18/2024    Approval #: [id number]    Approved dates: 7/17/204 to 7/17/2025    Pharmacy for medication: on pt assistance    QF-TB results:  ordered 7/12/2024

## 2024-09-23 ENCOUNTER — OFFICE VISIT (OUTPATIENT)
Dept: FAMILY MEDICINE CLINIC | Facility: CLINIC | Age: 49
End: 2024-09-23
Payer: COMMERCIAL

## 2024-09-23 VITALS
HEART RATE: 82 BPM | DIASTOLIC BLOOD PRESSURE: 78 MMHG | WEIGHT: 226 LBS | TEMPERATURE: 98 F | HEIGHT: 67.5 IN | SYSTOLIC BLOOD PRESSURE: 122 MMHG | BODY MASS INDEX: 35.06 KG/M2 | RESPIRATION RATE: 16 BRPM

## 2024-09-23 DIAGNOSIS — E66.09 CLASS 1 OBESITY DUE TO EXCESS CALORIES WITH SERIOUS COMORBIDITY AND BODY MASS INDEX (BMI) OF 34.0 TO 34.9 IN ADULT: Primary | ICD-10-CM

## 2024-09-23 PROCEDURE — 99213 OFFICE O/P EST LOW 20 MIN: CPT | Performed by: FAMILY MEDICINE

## 2024-09-23 NOTE — PROGRESS NOTES
HPI: Donald is a 49 year old male who presents for weight concerns. Pt has been considering taking weight loss pills. Has tried intermittent fasting and exercising. Pt states he has given off coffee and sugar. Drinks about 4-6 beers on the weekend.     PMH:    Past Medical History:    Arrhythmia    2 episodes of Vfib 12/20/19    Coronary atherosclerosis    Heart attack (HCC)    Psoriasis    Psoriatic arthritis (HCC)      Alg:  Patient has no known allergies.   Meds:   Current Outpatient Medications on File Prior to Visit   Medication Sig Dispense Refill    Secukinumab, 300 MG Dose, (COSENTYX SENSOREADY, 300 MG,) 150 MG/ML Subcutaneous Solution Auto-injector Inject 300 mg into the skin every 28 days. 6 each 3    clopidogrel 75 MG Oral Tab Take 1 tablet (75 mg total) by mouth daily.      ezetimibe 10 MG Oral Tab Take 1 tablet (10 mg total) by mouth every evening.      aspirin 81 MG Oral Tab EC Take 1 tablet (81 mg total) by mouth daily.      atorvastatin 80 MG Oral Tab Take 1 tablet (80 mg total) by mouth nightly. 30 tablet 3    Enalapril Maleate 5 MG Oral Tab Take 1 tablet (5 mg total) by mouth 2 (two) times daily. 60 tablet 3    spironolactone 25 MG Oral Tab Take 1 tablet (25 mg total) by mouth daily. 30 tablet 3    Metoprolol Succinate ER 25 MG Oral Tablet 24 Hr Take 1 tablet (25 mg total) by mouth daily. 30 tablet 3     No current facility-administered medications on file prior to visit.      Tobacco Use: no    ROS: see HPI    Objective:   Gen: AOx3. NAD.   /78   Pulse 82   Temp 97.9 °F (36.6 °C) (Temporal)   Resp 16   Ht 5' 7.5\" (1.715 m)   Wt 226 lb (102.5 kg)   BMI 34.87 kg/m²   CV:  Regular rate and rhythm; no murmurs  Lungs:  Clear to ausculation; good aeration               No wheezes, rales or rhonchi      Assessment:/Plan:  Encounter Diagnosis   Name Primary?    Class 1 obesity due to excess calories with serious comorbidity and body mass index (BMI) of 34.0 to 34.9 in adult Yes       Due to  complex medical history, will refer to Bariatric clinic for advice and prescriptions.  Encouraged patient to decrease alcohol consumption, continue to exercise.     Colleen Weiler, DO

## 2024-11-21 ENCOUNTER — LAB ENCOUNTER (OUTPATIENT)
Dept: LAB | Facility: HOSPITAL | Age: 49
End: 2024-11-21
Attending: INTERNAL MEDICINE
Payer: COMMERCIAL

## 2024-11-21 DIAGNOSIS — E78.5 HYPERLIPIDEMIA: Primary | ICD-10-CM

## 2024-11-21 DIAGNOSIS — I10 HYPERTENSION: ICD-10-CM

## 2024-11-21 LAB
ALBUMIN SERPL-MCNC: 4.7 G/DL (ref 3.2–4.8)
ALBUMIN/GLOB SERPL: 1.7 {RATIO} (ref 1–2)
ALP LIVER SERPL-CCNC: 100 U/L
ALT SERPL-CCNC: 34 U/L
ANION GAP SERPL CALC-SCNC: 7 MMOL/L (ref 0–18)
AST SERPL-CCNC: 21 U/L (ref ?–34)
BILIRUB SERPL-MCNC: 0.5 MG/DL (ref 0.3–1.2)
BUN BLD-MCNC: 15 MG/DL (ref 9–23)
BUN/CREAT SERPL: 12.3 (ref 10–20)
CALCIUM BLD-MCNC: 10.2 MG/DL (ref 8.7–10.4)
CHLORIDE SERPL-SCNC: 105 MMOL/L (ref 98–112)
CHOLEST SERPL-MCNC: 144 MG/DL (ref ?–200)
CK SERPL-CCNC: 110 U/L
CO2 SERPL-SCNC: 28 MMOL/L (ref 21–32)
CREAT BLD-MCNC: 1.22 MG/DL
EGFRCR SERPLBLD CKD-EPI 2021: 73 ML/MIN/1.73M2 (ref 60–?)
FASTING PATIENT LIPID ANSWER: YES
FASTING STATUS PATIENT QL REPORTED: YES
GLOBULIN PLAS-MCNC: 2.7 G/DL (ref 2–3.5)
GLUCOSE BLD-MCNC: 100 MG/DL (ref 70–99)
HDLC SERPL-MCNC: 50 MG/DL (ref 40–59)
LDLC SERPL CALC-MCNC: 79 MG/DL (ref ?–100)
NONHDLC SERPL-MCNC: 94 MG/DL (ref ?–130)
OSMOLALITY SERPL CALC.SUM OF ELEC: 291 MOSM/KG (ref 275–295)
POTASSIUM SERPL-SCNC: 4.2 MMOL/L (ref 3.5–5.1)
PROT SERPL-MCNC: 7.4 G/DL (ref 5.7–8.2)
SODIUM SERPL-SCNC: 140 MMOL/L (ref 136–145)
TRIGL SERPL-MCNC: 77 MG/DL (ref 30–149)
VLDLC SERPL CALC-MCNC: 12 MG/DL (ref 0–30)

## 2024-11-21 PROCEDURE — 82550 ASSAY OF CK (CPK): CPT

## 2024-11-21 PROCEDURE — 86480 TB TEST CELL IMMUN MEASURE: CPT | Performed by: INTERNAL MEDICINE

## 2024-11-21 PROCEDURE — 36415 COLL VENOUS BLD VENIPUNCTURE: CPT | Performed by: INTERNAL MEDICINE

## 2024-11-21 PROCEDURE — 80053 COMPREHEN METABOLIC PANEL: CPT

## 2024-11-21 PROCEDURE — 80061 LIPID PANEL: CPT

## 2024-11-23 LAB
M TB IFN-G CD4+ T-CELLS BLD-ACNC: 0.01 IU/ML
M TB TUBERC IFN-G BLD QL: NEGATIVE
M TB TUBERC IGNF/MITOGEN IGNF CONTROL: >10 IU/ML
QFT TB1 AG MINUS NIL: 0.03 IU/ML
QFT TB2 AG MINUS NIL: 0.02 IU/ML

## 2024-12-09 ENCOUNTER — TELEPHONE (OUTPATIENT)
Dept: RHEUMATOLOGY | Facility: CLINIC | Age: 49
End: 2024-12-09

## 2025-06-16 ENCOUNTER — HOSPITAL ENCOUNTER (EMERGENCY)
Facility: HOSPITAL | Age: 50
Discharge: HOME | End: 2025-06-16
Attending: EMERGENCY MEDICINE
Payer: COMMERCIAL

## 2025-06-16 ENCOUNTER — APPOINTMENT (OUTPATIENT)
Dept: GENERAL RADIOLOGY | Facility: HOSPITAL | Age: 50
End: 2025-06-16
Attending: EMERGENCY MEDICINE
Payer: COMMERCIAL

## 2025-06-16 ENCOUNTER — APPOINTMENT (OUTPATIENT)
Dept: CV DIAGNOSTICS | Facility: HOSPITAL | Age: 50
End: 2025-06-16
Attending: EMERGENCY MEDICINE
Payer: COMMERCIAL

## 2025-06-16 VITALS
DIASTOLIC BLOOD PRESSURE: 80 MMHG | RESPIRATION RATE: 12 BRPM | HEART RATE: 64 BPM | SYSTOLIC BLOOD PRESSURE: 113 MMHG | OXYGEN SATURATION: 98 % | TEMPERATURE: 98 F

## 2025-06-16 DIAGNOSIS — R07.9 CHEST PAIN OF UNCERTAIN ETIOLOGY: Primary | ICD-10-CM

## 2025-06-16 LAB
% OF MAX PREDICTED HR: 100 %
ALBUMIN SERPL-MCNC: 4.8 G/DL (ref 3.2–4.8)
ALBUMIN/GLOB SERPL: 2.2 {RATIO} (ref 1–2)
ALP LIVER SERPL-CCNC: 105 U/L (ref 45–117)
ALT SERPL-CCNC: 34 U/L (ref 10–49)
ANION GAP SERPL CALC-SCNC: 9 MMOL/L (ref 0–18)
AST SERPL-CCNC: 26 U/L (ref ?–34)
ATRIAL RATE: 63 BPM
ATRIAL RATE: 67 BPM
BASOPHILS # BLD AUTO: 0.06 X10(3) UL (ref 0–0.2)
BASOPHILS NFR BLD AUTO: 1.2 %
BILIRUB SERPL-MCNC: 0.6 MG/DL (ref 0.3–1.2)
BUN BLD-MCNC: 13 MG/DL (ref 9–23)
BUN/CREAT SERPL: 11.4 (ref 10–20)
CALCIUM BLD-MCNC: 9.7 MG/DL (ref 8.7–10.4)
CHLORIDE SERPL-SCNC: 106 MMOL/L (ref 98–112)
CO2 SERPL-SCNC: 26 MMOL/L (ref 21–32)
CREAT BLD-MCNC: 1.14 MG/DL (ref 0.7–1.3)
D DIMER PPP FEU-MCNC: <0.27 UG/ML FEU (ref ?–0.5)
DEPRECATED RDW RBC AUTO: 42 FL (ref 35.1–46.3)
EGFRCR SERPLBLD CKD-EPI 2021: 79 ML/MIN/1.73M2 (ref 60–?)
EOSINOPHIL # BLD AUTO: 0.11 X10(3) UL (ref 0–0.7)
EOSINOPHIL NFR BLD AUTO: 2.1 %
ERYTHROCYTE [DISTWIDTH] IN BLOOD BY AUTOMATED COUNT: 12.6 % (ref 11–15)
GLOBULIN PLAS-MCNC: 2.2 G/DL (ref 2–3.5)
GLUCOSE BLD-MCNC: 120 MG/DL (ref 70–99)
HCT VFR BLD AUTO: 41.8 % (ref 39–53)
HGB BLD-MCNC: 14.4 G/DL (ref 13–17.5)
IMM GRANULOCYTES # BLD AUTO: 0.01 X10(3) UL (ref 0–1)
IMM GRANULOCYTES NFR BLD: 0.2 %
LYMPHOCYTES # BLD AUTO: 1.26 X10(3) UL (ref 1–4)
LYMPHOCYTES NFR BLD AUTO: 24.4 %
MAX DIASTOLIC BP: 92 MMHG
MAX HEART RATE: 173 BPM
MAX PREDICTED HEART RATE: 171 BPM
MAX SYSTOLIC BP: 192 MMHG
MAX WORK LOAD: 125
MCH RBC QN AUTO: 31.2 PG (ref 26–34)
MCHC RBC AUTO-ENTMCNC: 34.4 G/DL (ref 31–37)
MCV RBC AUTO: 90.7 FL (ref 80–100)
MONOCYTES # BLD AUTO: 0.43 X10(3) UL (ref 0.1–1)
MONOCYTES NFR BLD AUTO: 8.3 %
NEUTROPHILS # BLD AUTO: 3.29 X10 (3) UL (ref 1.5–7.7)
NEUTROPHILS # BLD AUTO: 3.29 X10(3) UL (ref 1.5–7.7)
NEUTROPHILS NFR BLD AUTO: 63.8 %
OSMOLALITY SERPL CALC.SUM OF ELEC: 293 MOSM/KG (ref 275–295)
P AXIS: 22 DEGREES
P AXIS: 49 DEGREES
P-R INTERVAL: 164 MS
P-R INTERVAL: 172 MS
PLATELET # BLD AUTO: 313 10(3)UL (ref 150–450)
POTASSIUM SERPL-SCNC: 4.4 MMOL/L (ref 3.5–5.1)
PROT SERPL-MCNC: 7 G/DL (ref 5.7–8.2)
Q-T INTERVAL: 370 MS
Q-T INTERVAL: 388 MS
QRS DURATION: 100 MS
QRS DURATION: 106 MS
QTC CALCULATION (BEZET): 390 MS
QTC CALCULATION (BEZET): 397 MS
R AXIS: 241 DEGREES
R AXIS: 267 DEGREES
RBC # BLD AUTO: 4.61 X10(6)UL (ref 4.3–5.7)
SODIUM SERPL-SCNC: 141 MMOL/L (ref 136–145)
T AXIS: 48 DEGREES
T AXIS: 59 DEGREES
TROPONIN I SERPL HS-MCNC: 3 NG/L (ref ?–53)
TROPONIN I SERPL HS-MCNC: 3 NG/L (ref ?–53)
VENTRICULAR RATE: 63 BPM
VENTRICULAR RATE: 67 BPM
WBC # BLD AUTO: 5.2 X10(3) UL (ref 4–11)

## 2025-06-16 PROCEDURE — 93017 CV STRESS TEST TRACING ONLY: CPT | Performed by: INTERNAL MEDICINE

## 2025-06-16 PROCEDURE — 84484 ASSAY OF TROPONIN QUANT: CPT | Performed by: EMERGENCY MEDICINE

## 2025-06-16 PROCEDURE — 93005 ELECTROCARDIOGRAM TRACING: CPT

## 2025-06-16 PROCEDURE — 80053 COMPREHEN METABOLIC PANEL: CPT | Performed by: EMERGENCY MEDICINE

## 2025-06-16 PROCEDURE — 85379 FIBRIN DEGRADATION QUANT: CPT | Performed by: EMERGENCY MEDICINE

## 2025-06-16 PROCEDURE — 36415 COLL VENOUS BLD VENIPUNCTURE: CPT

## 2025-06-16 PROCEDURE — 93016 CV STRESS TEST SUPVJ ONLY: CPT | Performed by: INTERNAL MEDICINE

## 2025-06-16 PROCEDURE — 93010 ELECTROCARDIOGRAM REPORT: CPT

## 2025-06-16 PROCEDURE — 93018 CV STRESS TEST I&R ONLY: CPT | Performed by: INTERNAL MEDICINE

## 2025-06-16 PROCEDURE — 99285 EMERGENCY DEPT VISIT HI MDM: CPT

## 2025-06-16 PROCEDURE — 71045 X-RAY EXAM CHEST 1 VIEW: CPT | Performed by: EMERGENCY MEDICINE

## 2025-06-16 PROCEDURE — 85025 COMPLETE CBC W/AUTO DIFF WBC: CPT | Performed by: EMERGENCY MEDICINE

## 2025-06-16 NOTE — ED INITIAL ASSESSMENT (HPI)
Pt to ED for chest pain that started last night, sob, and nausea since last evening. Pt has history of a heart attack in 2019.   Resident

## 2025-06-16 NOTE — CONSULTS
[facility]    PATIENT'S NAME: LEONOR PENALOZA   ATTENDING PHYSICIAN: Jer Rosas MD   CONSULTING PHYSICIAN: Claudio Flores MD   PATIENT ACCOUNT#:   [id number]    LOCATION:  [address]  MEDICAL RECORD #:   [id number]       YOB: 1975  ADMISSION DATE:       06/16/2025      CONSULT DATE:  06/16/2025    REPORT OF CONSULTATION      HISTORY OF PRESENT ILLNESS:  The patient is a 49-year-old patient who developed a sharp stabbing midsternal chest pain at about 9 p.m. last evening.  He was at rest at the time and it lasted about 1 minute.  It completely resolved at that point.  He was awakened by the same pain rated 6 on a scale of 1 to 10 at about 1 in the morning.  It lasted about 2 hours.  There was some associated shortness of breath at that time and  slight nausea but no sweats or dizziness.  The symptoms recurred off and on, and he came to the ER for further evaluation.  He is currently pain free.    PAST MEDICAL HISTORY:  Coronary artery disease status post anterior wall MI 2019.  He underwent emergency PCI of the LAD at that time with good results.  He was left with a small apical scar but overall preserved ejection fraction.  Dyslipidemia.  Psoriasis with psoriatic arthritis.    PAST SURGICAL HISTORY:  None.    SOCIAL HISTORY:   with 2 adult children, works as a supervisor.  Smoked cigars in the past and occasional alcohol.    MEDICATIONS:  Spironolactone 25 mg q.a.m., metoprolol succinate 25 mg q.a.m., enalapril 5 mg q.a.m., Plavix 75 q.a.m., atorvastatin 80 at bedtime, aspirin 81 q.a.m., Repatha 140 every 2 weeks.    ALLERGIES:  No known drug allergies.    REVIEW OF SYSTEMS:  Otherwise negative.      PHYSICAL EXAMINATION:    GENERAL:  Well-developed, well-nourished male in no acute distress, alert and oriented x3.  VITAL SIGNS:  Blood pressure 124/82.  Respirations 13, breathing unlabored.  Pulse 63, regular rhythm.  Afebrile.  Saturation 97% on room air.  HEENT:   Unremarkable.  NECK:  Supple.  Jugular venous pressure normal.  Carotids brisk without bruits.  No thyromegaly or adenopathy.  LUNGS:  Clear.  HEART:  S1 and S2 are normal.  There is no gallop, murmur, rub, or click.  ABDOMEN:  Soft, nontender.  Bowel sounds present.  No organomegaly, masses, bruits, or pulsations.  EXTREMITIES:  Warm and dry.  Pulses strong and equal.  No cyanosis, clubbing, or edema.  No calf, thigh, or popliteal tenderness.  NEUROLOGIC:  Normal.  SKIN:  Normal.    LABORATORY DATA:  Chemistries are normal.  Troponin is 3.  D-dimer and CBC and differential are normal.  The patient is currently pain free.    EKG shows sinus rhythm with a rate of 63.  Cannot exclude old inferior and anterior wall MI.  No acute ischemic changes.    Chest x-ray is normal.      ASSESSMENT:    1.   Chest pain with some features typical, others atypical for ischemia.  Currently pain-free.  Normal troponin.  No acute EKG abnormalities.  Overall suspicion that this represents myocardial ischemia is low.  2.   History of coronary artery disease and anterior wall myocardial infarction 2019.  History of PCI to the proximal to mid LAD at that time.  Small apical scar.  No congestive heart failure.    PLAN:  Repeat EKG and troponin in 2 hours.  If normal, would recommend a regular GXT to see if exercise reproduces his chest pain.  If not, he could be discharged home from a cardiac standpoint.    Dictated By Claudio Flores MD  d: 06/16/2025 09:00:53  t: 06/16/2025 10:22:48  [facility] [id number]  [facility] [place]

## 2025-06-16 NOTE — HISTORICAL OFFICE NOTE
CHIEF COMPLAINT    CHIEF COMPLAINT  Reason for Visit/Chief Complaint   Follow up   Mr. Sloan is here for a 6-month follow-up visit. He has enjoyed good health and has had no cardiac symptoms or limitation. He presented in 2019 with an acute anterior wall MI. He had stenting of his LAD with excellent results but there was distal embolization which left him with anteroapical scar.When I saw him in December he was mentioning some shortness of breath and chest discomfort. We had him undergo an echocardiogram which showed an ejection fraction of 48% and normal valve function. PA pressure was normal. A nuclear stress test done at that time showed a small fixed perfusion abnormality in the anterior and apical anterior segments. There was no reversible ischemia. Ejection fraction was 50% globally. Based on those results we did not do any further invasive testing. He has remained stable.Lipid panel in December showed an LDL of 140. At the time he was inconsistent in taking his statin. He has been taking it regularly now and we will recheck the lab numbers. LDL goal is under 70.     PROBLEMS  Reconcile with Patient's ChartPROBLEMS  Problem Effective Dates Date resolved Problem Status   Hyperlipidemia (unspecified), [SNOMED-CT: 57716469] 11/5/2021 - Active   Hypertension (HTN), primary, [SNOMED-CT: 80687888] 11/5/2021 - Active   STEMI (ST elevation myocardial infarction), [SNOMED-CT: 70077058] 2/3/2020 - Active   Coronary artery disease involving native coronary artery of native heart without angina pectoris, [SNOMED-CT: 868918495] 2/4/2020 - Active   Ischemic cardiomyopathy, [SNOMED-CT: 776415891] 2/4/2020 - Active     ENCOUNTER DIAGNOSIS    ENCOUNTER DIAGNOSIS  Problem Effective Dates Date resolved Problem Status   Hyperlipidemia (unspecified), [SNOMED-CT: 90030210] 11/5/2021 - Active   Hypertension (HTN), primary, [SNOMED-CT: 07151046] 11/5/2021 - Active   Coronary artery disease involving native coronary artery of native  heart without angina pectoris, [SNOMED-CT: 575325607] 2/4/2020 - Active   Ischemic cardiomyopathy, [SNOMED-CT: 216562428] 2/4/2020 - Active     VITAL SIGNS    VITAL SIGNS  Date / Time: 5/31/2024   BP Systolic 118 mmHg   BP Diastolic 68 mmHg   Height 68 inches   Weight 213 lbs   Pulse Rate 63 bpm   BSA (Body Surface Area) 2.2 cc/m2   BMI (Body Mass Index) 32.4 cc/m2   Blood Pressure 118 / 68 mmHg     PHYSICAL EXAMINATION    PHYSICAL EXAMINATION  Header Details   Constitutional 98%O2   Vitals Left Arm Sitting  / 68 mmHg, Pulse rate 63 bpm, Regular, Height in 5' 8\", BMI: 32.4, Weight in 213.85 lbs (or) 97 kgs, BSA : 2.18 cc/m²   General Appearance No Acute Distress   Head/Eyes/Ears/Nose/Mouth/Throat Mucous membranes Moist   Neck Normal carotid pulsations, No carotid bruits, No JVD   Respiratory Unlabored, Lungs clear with normal breath sounds   Cardiovascular Intact distal pulses, Regular rhythm. Normal rate present. Normal and normal S1 and S2   Gastrointestinal Abdomen soft, Non-tender, Normoactive bowel sounds, No organomegaly   Lower Extremities Pulses 2+ and equal bilaterally, No edema   Skin Warm and dry   Neurologic / Psychiatric Alert and Oriented     ALLERGIES, ADVERSE REACTIONS, ALERTS    No data available    MEDICATIONS ADMINISTERED DURING VISIT    No data available    MEDICATIONS  Reconcile with Patient's ChartMEDICATIONS  Medication Start Date Route/Frequency Status   ATORVASTATIN TAB 80MG, [RxNorm: 461127] - TAKE 1 TABLET ONCE DAILY Active   CLOPIDOGREL 75 MG TABLET, [RxNorm: 950746] - TAKE 1 TABLET BY MOUTH EVERY DAY Active   Cosentyx Pen 150 mg/mL subcutaneous, [RxNorm: 3743823] 12/1/2023 twice a month Active   ENALAPRIL TAB 5MG, [RxNorm: 184724] - TAKE 1 TABLET TWICE A DAY Active   METOPROL SUC TAB 25MG ER, [RxNorm: 461843] - TAKE 1 TABLET DAILY Active   spironolactone 25 mg tablet, [RxNorm: 775966] 11/8/2022 Take 1 tablet orally once a day. Active     ASSESSMENT    Assessment:  1. CAD, status  post remote anterior wall MI and PCI of the LAD. Stable and asymptomatic. Good blood pressure control. Lipids not at target historically.2. Left ventricular dysfunction secondary to his infarct. Low normal ejection fraction. Not enalapril, spironolactone, and metoprolol. Ejection fraction has been stable.Plan:  1. Lipid panel, CMP, CK soon. Please give patient written order.2. Continue same cardiac medications for now.3. Office visit in 6 months     FAMILY HISTORY    No data available    GENERAL STATUS    No data available    PAST MEDICAL HISTORY    PAST MEDICAL HISTORY  Problem Date diagonsed Date resolved Status   Hyperlipidemia (unspecified), [SNOMED-CT: 88657578] 11/5/2021 - Active   Hypertension (HTN), primary, [SNOMED-CT: 98147465] 11/5/2021 - Active   STEMI (ST elevation myocardial infarction), [SNOMED-CT: 25547259] 2/3/2020 - Active   Coronary artery disease involving native coronary artery of native heart without angina pectoris, [SNOMED-CT: 432785051] 2/4/2020 - Active   Ischemic cardiomyopathy, [SNOMED-CT: 623662864] 2/4/2020 - Active     HISTORY OF PRESENT ILLNESS    Mr. Sloan is here for a 6-month follow-up visit. He has enjoyed good health and has had no cardiac symptoms or limitation. He presented in 2019 with an acute anterior wall MI. He had stenting of his LAD with excellent results but there was distal embolization which left him with anteroapical scar.When I saw him in December he was mentioning some shortness of breath and chest discomfort. We had him undergo an echocardiogram which showed an ejection fraction of 48% and normal valve function. PA pressure was normal. A nuclear stress test done at that time showed a small fixed perfusion abnormality in the anterior and apical anterior segments. There was no reversible ischemia. Ejection fraction was 50% globally. Based on those results we did not do any further invasive testing. He has remained stable.Lipid panel in December showed an LDL of  140. At the time he was inconsistent in taking his statin. He has been taking it regularly now and we will recheck the lab numbers. LDL goal is under 70.     IMMUNIZATIONS    No data available    PLAN OF CARE    PLAN OF CARE  Planned Care Date   Lipid Panel_Fasting 1/1/1900   CK (Creatine kinase) 1/1/1900   CMP (comprehensive metabolic panel) 1/1/1900   Referral Visit - Colleen Weiler [email or web address]) : 1/1/1900   Follow up visit - Claudio Flores MD 1/1/1900     PROCEDURES    No data available    RESULTS    RESULTS  Name Result Date Location - Ordered By   GLUCOSE [LOINC: 2339-0] 89 mg/dL 12/06/2023 11:48:00 AM [facility] ([facility])  Address: [address] [facility]  [address]  tel:   SODIUM [LOINC: 2951-2] 137 mmol/L 12/06/2023 11:48:00 AM [facility] ([facility])  Address: [address] [facility]  [address]  tel:   POTASSIUM [LOINC: 2823-3] 4.5 mmol/L 12/06/2023 11:48:00 AM [facility] ([facility])  Address: [address] [facility]  [address]  tel:   CHLORIDE [LOINC: 2075-0] 104 mmol/L 12/06/2023 11:48:00 AM [facility] ([facility])  Address: [address]  [facility]  [address]  tel:   CO2 [LOINC: 2028-9] 28.0 mmol/L 12/06/2023 11:48:00 AM [facility] ([facility])  Address: [address]  [facility]  [address]  tel:   ANION GAP [LOINC: 33037-3] 5 mmol/L 12/06/2023 11:48:00 AM [facility] ([facility])  Address: [address] [facility]  [address]  tel:   BUN [LOINC: 6299-2] 13 mg/dL 12/06/2023 11:48:00 AM [facility] ([facility])  Address: [address]  [facility]  [address]  tel:   CREATININE [LOINC: 96774-3] 1.23 mg/dL 12/06/2023 11:48:00 AM [facility] ([facility])  Address: [address]  [facility]  [address]  tel:   BUN/ CREAT RATIO [LOINC: 3097-3] 10.6 12/06/2023 11:48:00  AM [facility] ([facility])  Address: [address]  [facility]  [address]  tel:   CALCIUM [LOINC: 24059-4] 10.0 mg/dL 12/06/2023 11:48:00 AM [facility] ([facility])  Address: [address]  [facility]  [address]  tel:   OSMOLALITY CALCULATED [LOINC: 33331-9] 284 mOsm/kg 12/06/2023 11:48:00 AM [facility] ([facility])  Address: [address]  [facility]  [address]  tel:   E GFR CR [LOINC: 20911-8] 72 mL/min/1.73m2 12/06/2023 11:48:00 AM [facility] ([facility])  Address: [address]  [facility]  [address]  tel:   ALT [LOINC: 1742-6] 21 U/L 12/06/2023 11:48:00 AM [facility] ([facility])  Address: [address]  [facility]  [address]  tel:   AST [LOINC: 1920-8] 18 U/L 12/06/2023 11:48:00 AM [facility] ([facility])  Address: [address]  [facility]  [address]  tel:   ALKALINE PHOSPHATASE [LOINC: 1779-8] 93 U/L 12/06/2023 11:48:00 AM [facility] ([facility])  Address: [address]  [facility]  [address]  tel:   BILIRUBIN, TOTAL [LOINC: 1975-2] 0.5 mg/dL 12/06/2023 11:48:00 AM [facility] ([facility])  Address: [address]  [facility]  [address]  tel:   TOTAL PROTEIN [LOINC: 2885-2] 7.4 g/dL 12/06/2023 11:48:00 AM [facility] ([facility])  Address: [address]  [facility]  [address]  tel:   ALBUMIN [LOINC: 1751-7] 4.5 g/dL 12/06/2023 11:48:00 AM [facility] ([facility])  Address: [address]  [facility]  [address]  tel:   GLOBULIN [LOINC: 04040-9] 2.9 g/dL 12/06/2023 11:48:00 AM [facility] ([facility])  Address: [address]  [facility]  [address]  tel:   A/G RATIO [LOINC: 1759-0] 1.6 12/06/2023 11:48:00 AM [facility] ([facility])  Address: [address]  [facility]  [address]  tel:    FASTING PATIENT CMP ANSWER [LOINC: 00879-6] Yes 12/06/2023 11:48:00 AM [facility] ([facility])  Address: [address]  [facility]  [address]  tel:   CK [LOINC: 2157-6] 127 U/L 12/06/2023 11:48:00 AM [facility] ([facility])  Address: [address]  [facility]  [address]  tel:   CHOLESTEROL [LOINC: 2093-3] 216 mg/dL [High] 12/06/2023 11:48:00 AM [facility] ([facility])  Address: [address]  [facility]  [address]  tel:   HDL CHOL [LOINC: 2085-9] 48 mg/dL 12/06/2023 11:48:00 AM [facility] ([facility])  Address: [address]  [facility]  [address]  tel:   TRIGLYCERIDES [LOINC: 2571-8] 154 mg/dL [High] 12/06/2023 11:48:00 AM [facility] ([facility])  Address: [address]  [facility]  [address]  tel:   LDL CHOLESTROL [LOINC: 26738-4] 140 mg/dL [High] 12/06/2023 11:48:00 AM [facility] ([facility])  Address: [address]  [facility]  [address]  tel:   VLDL [LOINC: 44511-6] 29 mg/dL 12/06/2023 11:48:00 AM [facility] ([facility])  Address: [address]  [facility]  [address]  tel:   NON HDL CHOL [LOINC: 68847-9] 168 mg/dL [High] 12/06/2023 11:48:00 AM [facility] ([facility])  Address: [address]  [facility]  [address]  tel:   FASTING PATIENT LIPID ANSWER [LOINC: 70248077] Yes 12/06/2023 11:48:00 AM [facility] ([facility])  Address: [address] [facility]  [address]  tel:   Lipid Panel_Fasting [LOINC: 91544-3] Pending Schedule next available     CK (Creatine kinase) [LOINC: HQC4843] Pending Schedule next available     CMP (comprehensive metabolic panel) [LOINC: 30650-8] Pending Schedule next available     Myocardial Perfusion Imaging 1.Stress EKG is normal. 2.Maximal exercise treadmill test (MPHR : 101 %).3.The functional capacity is excellent (12.1 METs).4.No  chest discomfort.5.Occasional PVCs.1.This is an abnormal perfusion study. 2.Small fixed perfusion abnormality of moderate intensity in the apical and apical anterior segments. 3.The left ventricular cavity is noted to be normal on the stress study. The left ventricular ejection fraction was calculated to be 50% and left ventricular global function is normal. Apical hypokinesis.4.This scan is suggestive of low risk for future cardiovascular events. 5.The study quality is below average. 12/8/2023 7:30:00 AM Benito Benavides MD   Stress Echocardiogram 1.Abnormal study due to apical wall motion abnormality, consistent with prior infarction. No signs of new exercise-induced ischemis.2.Rest echocardiogram shows mild hypokinesis of apical anterior segment and apical lateral segment.Mildly decreased left ventricular systolic function with estimated ejection fraction of 50%. Global left ventricular systolic function is mildly decreased. 3.During peak exercise mild hypokinesis of apical segment persists. The remaimder of the ventrical showed normal contractility, including the areas that were hypokinetic at rest. 11/1/2022 8:00:00 AM Claudio Flores MD   Trans Thoracic Echocardiogram 1.The left ventricle is normal in size. The left ventricular wall thickness is normal. Global left ventricular systolic function is mildly decreased. The left ventricular ejection fraction is 48%. Regional wall motion analysis shows hypokinesis of apiex.. Left ventricular diastolic function is normal.2.The right ventricle is normal in size. Right ventricular systolic function is normal. 3.Trace mitral regurgitation. 11/29/2023 9:00:00 AM Claudio Flores MD     REVIEW OF SYSTEMS    REVIEW OF SYSTEMS  Header Details   Cardiovascular No history of Chest pain, YUAN, Palpitations, Syncope, PND, Orthopnea, Edema, Claudication   Respiratory No history of SOB, Wheezing, Sputum   Hem/Lymphatic No history of Easy bruising, Blood clots, Hx of blood transfusion,  Anemia, Bleeding problems     SOCIAL HISTORY    SOCIAL HISTORY  Social History Element Description Effective Dates   Smoking status Former smoker -     FUNCTIONAL STATUS    No data available    MEDICAL EQUIPMENT    No data available    Goals Sections    No data available    REASON FOR REFERRAL               Health Concerns Section    No data available    COGNITIVE/MENTAL STATUS    No data available    Patient Demographics    Patient Demographics  Patient Address Patient Name Communication   [address] [place] [address] Donald Sloan [phone] (Mobile)     Patient Demographics  Language Race / Ethnicity Marital Status   English - Spoken (Preferred) Unknown /  or       Document Information    Primary Care Provider Other Service Providers Document Coverage Dates   Claudio Flores  NPI: [id number]  [phone] (Work)  [address], [address]  [place] [address]  [place] [address]  Interpreting Physicians  [facility]  [phone] (Work)  [address] [facility] [place] [address] Brook Amador  NPI: [id number]  [phone] (Work)  [address], [address]  [place] [address]  [place] [address]  Nurses     Rayna Bear  NPI: [id number]  [phone] (Work)  [address], [address]  [place] [address]  [place] [address]  Nurses May 31, 2024May 31, 2024      Organization   [facility]  [phone] (Work)  [address], [address]  [place] [address]  [place] [address]     Encounter Providers Encounter Date    May 31, 2024May 31, 2024     Legal Authenticator    Claudio Flores  NPI: [id number]  [phone] (Work)  [address], [address]  [place] [address]  [place] [address]

## 2025-06-16 NOTE — ED PROVIDER NOTES
Patient Seen in: [facility] Emergency Department        History  Chief Complaint   Patient presents with    Chest Pain Angina     Stated Complaint: chest pain    Subjective:   HPI            49-year-old male with history of coronary artery disease status postmyocardial infarction and prior episodes of ventricular fibrillation in 2019 presents with complaints of chest pain.  The patient reports a sharp pain to the center of his chest around 9 PM last night.  He reports that pain resolved but then he had a different, aching pain beginning around 1 AM and lasting for 3 to 4 hours.  He states the pain is dissipated and reports minimal pain at present.  He states this pain feels similar in character but less intense than pain he experienced with his myocardial infarction in 2019.  He reports mild, nonproductive cough.  No fevers.  No dyspnea at present but reports having some shortness of breath overnight when he was experiencing the pain.  He denies leg pain or swelling.  He denies history of DVT or PE.      Objective:     Past Medical History:    Arrhythmia    2 episodes of Vfib 19    Coronary atherosclerosis    Heart attack (HCC)    Psoriasis    Psoriatic arthritis (HCC)              History reviewed. No pertinent surgical history.             Social History     Socioeconomic History    Marital status:    Tobacco Use    Smoking status: Former     Current packs/day: 0.00     Average packs/day: 0.5 packs/day for 16.0 years (8.0 ttl pk-yrs)     Types: Cigarettes, Cigars     Start date: 2003     Quit date: 2019     Years since quittin.4    Smokeless tobacco: Never   Vaping Use    Vaping status: Never Used   Substance and Sexual Activity    Alcohol use: Yes     Alcohol/week: 6.0 standard drinks of alcohol     Types: 6 Cans of beer per week     Comment: 6 beers/day in the past 1-2 weeks as of 2025    Drug use: Never                                Physical Exam    ED Triage Vitals  [06/16/25 0624]   /85   Pulse 76   Resp 18   Temp 97.7 °F (36.5 °C)   Temp src Temporal   SpO2 97 %   O2 Device None (Room air)       Current Vitals:   Vital Signs  BP: 118/74  Pulse: 59  Resp: 13  Temp: 97.7 °F (36.5 °C)  Temp src: Temporal  MAP (mmHg): 87    Oxygen Therapy  SpO2: 98 %  O2 Device: None (Room air)            Physical Exam    General Appearance: alert, no distress  Eyes: pupils equal and round no pallor or injection  ENT, Mouth: mucous membranes moist  Respiratory: there are no retractions, lungs are clear to auscultation.  No chest wall tenderness.  Cardiovascular: regular rate and rhythm  Gastrointestinal:  abdomen is soft and non tender, no masses, bowel sounds normal  Neurological: Speech normal.  Moving extremities x 4.  Skin: warm and dry, no rashes.  Musculoskeletal: neck is supple non tender        Extremities are symmetrical, full range of motion.  No leg edema or tenderness noted.  Psychiatric: patient is oriented X 3, there is no agitation    DIFFERENTIAL DIAGNOSIS: After history and physical exam differential diagnosis was considered for chest pain including chest wall pain, myocardial ischemia, pulmonary embolus and pulmonary infectious process.            ED Course  Labs Reviewed   COMP METABOLIC PANEL (14) - Abnormal; Notable for the following components:       Result Value    Glucose 120 (*)     A/G Ratio 2.2 (*)     All other components within normal limits   TROPONIN I HIGH SENSITIVITY - Normal   D-DIMER - Normal   TROPONIN I HIGH SENSITIVITY - Normal   CBC WITH DIFFERENTIAL WITH PLATELET     EKG    Rate, intervals and axes as noted on EKG Report.  Rate: 67  Rhythm: Sinus Rhythm  Axis: Right  Reading: Nonspecific EKG         2-hour EKG:  EKG    Rate, intervals and axes as noted on EKG Report.  Rate: 63  Rhythm: Sinus Rhythm  Axis: Normal  Reading: Nonspecific EKG                               MDM     Chest x-ray was reviewed independently by me.  No pneumonia or pneumothorax  noted.  Lab and EKG results noted.  No cause of the patient's pain found.  Discussed with [place] [facility] [place].  The patient was evaluated in the ED by his cardiologist, Dr. Flores.  He recommends a 2-hour troponin and, if negative, a treadmill stress test.    Stress test was completed and reviewed by Dr. Flores.  He states the patient is okay for discharge home.  The patient is advised to return if worsening symptoms develop.        Medical Decision Making      Disposition and Plan     Clinical Impression:  1. Chest pain of uncertain etiology         Disposition:  Discharge  6/16/2025 12:15 pm    Follow-up:  Weiler, Colleen M, DO  [address] [address]  [phone]    Follow up      Claudio Flores MD  [address] [place] [address]  [phone]    Follow up            Medications Prescribed:  Current Discharge Medication List                Supplementary Documentation:

## 2025-06-16 NOTE — HISTORICAL OFFICE NOTE
CHIEF COMPLAINT    CHIEF COMPLAINT  Reason for Visit/Chief Complaint   Follow up   Mr. Sloan is here for a 6-month follow-up visit. He feels good and has had no cardiac symptoms. The chest tightness and shortness of breath that he had last year is no longer occurring. He and his wife walk for exercise frequently.He presented in 2019 with an acute anterior wall MI. He initially had anteroapical dysfunction but that recovered on triple therapy. An echocardiogram from a year ago showed an ejection fraction of 48% with normal valve function. A nuclear stress test done a year ago showed a small fixed perfusion abnormality in the anterior and apical anterior segments. There was no reversibility. Overall ejection fraction is 50%.We are trying to get his LDL cholesterol under 70. Last year on 80 mg of atorvastatin the LDL cholesterol was 89. We added Zetia. Will get a follow-up lipid panel.     PROBLEMS  Reconcile with Patient's ChartPROBLEMS  Problem Effective Dates Date resolved Problem Status   Hyperlipidemia (unspecified), [SNOMED-CT: 97629917] 11/5/2021 - Active   Hypertension (HTN), primary, [SNOMED-CT: 89222197] 11/5/2021 - Active   STEMI (ST elevation myocardial infarction), [SNOMED-CT: 88457972] 2/3/2020 - Active   Coronary artery disease involving native coronary artery of native heart without angina pectoris, [SNOMED-CT: 693057178] 2/4/2020 - Active   Ischemic cardiomyopathy, [SNOMED-CT: 811095146] 2/4/2020 - Active     ENCOUNTER    ENCOUNTER  Problem Effective Dates Date resolved Problem Status   Hyperlipidemia (unspecified), [SNOMED-CT: 49802548] 11/5/2021 - Active   Hypertension (HTN), primary, [SNOMED-CT: 34687201] 11/5/2021 - Active   Coronary artery disease involving native coronary artery of native heart without angina pectoris, [SNOMED-CT: 401049515] 2/4/2020 - Active   Ischemic cardiomyopathy, [SNOMED-CT: 323123503] 2/4/2020 - Active     VITAL SIGNS    VITAL SIGNS  Date / Time: 11/21/2024   BP Systolic  118 mmHg   BP Diastolic 78 mmHg   Height 68 inches   Weight 226 lbs   Pulse Rate 66 bpm   BSA (Body Surface Area) 2.3 cc/m2   BMI (Body Mass Index) 34.4 cc/m2   Blood Pressure 118 / 78 mmHg     PHYSICAL EXAMINATION    PHYSICAL EXAMINATION  Header Details   Constitutional 97% O2 RA   Vitals Left Arm Sitting  / 78 mmHg, Pulse rate 66 bpm, Regular, Height in 5' 8\", BMI: 34.4, Weight in 227.08 lbs (or) 103 kgs, BSA : 2.26 cc/m²   General Appearance No Acute Distress   Head/Eyes/Ears/Nose/Mouth/Throat Mucous membranes Moist   Neck Normal carotid pulsations, No carotid bruits, No JVD   Respiratory Unlabored, Lungs clear with normal breath sounds, Equal bilaterally   Cardiovascular Intact distal pulses, Regular rhythm. Normal and normal S1 and S2   Gastrointestinal Abdomen soft, Non-tender, Normoactive bowel sounds   Lower Extremities Pulses 2+ and equal bilaterally, No edema   Skin Warm and dry   Neurologic / Psychiatric Alert and Oriented     ALLERGIES, ADVERSE REACTIONS, ALERTS    No data available    MEDICATIONS ADMINISTERED DURING VISIT    No data available    MEDICATIONS  Reconcile with Patient's ChartMEDICATIONS  Medication Start Date Route/Frequency Status   ATORVASTATIN TAB 80MG, [RxNorm: 424873] - TAKE 1 TABLET ONCE DAILY Active   CLOPIDOGREL 75 MG TABLET, [RxNorm: 297551] - TAKE 1 TABLET BY MOUTH EVERY DAY Active   Cosentyx Pen 150 mg/mL subcutaneous, [RxNorm: 2806838] 12/1/2023 twice a month Active   ENALAPRIL TAB 5MG, [RxNorm: 043696] - TAKE 1 TABLET TWICE A DAY Active   METOPROL SUC TAB 25MG ER, [RxNorm: 511516] - TAKE 1 TABLET DAILY Active   spironolactone 25 mg tablet, [RxNorm: 552138] 11/8/2022 Take 1 tablet orally once a day. Active   Zetia 10 mg tablet, [RxNorm: 712205] 6/18/2024 Take 1 tablet orally once in the evening. Active     ASSESSMENT    Assessment:  1. 5 years post anterior wall MI and PCI of the LAD. Stable and asymptomatic with good risk factor control. We will try to get his LDL  cholesterol under 70.2. Echo and stress test last year showed only a small area of apical hypokinesis.Plan:  1. Discontinue Plavix.2. Continue other cardiac medications.3. Lipid panel, CMP, CK soon. Please give patient written order. We will call patient with results.4. Office visit in 1 year     FAMILY HISTORY    No data available    GENERAL STATUS    No data available    PAST MEDICAL HISTORY    PAST MEDICAL HISTORY  Problem Date diagonsed Date resolved Status   Hyperlipidemia (unspecified), [SNOMED-CT: 14867039] 11/5/2021 - Active   Hypertension (HTN), primary, [SNOMED-CT: 85999075] 11/5/2021 - Active   STEMI (ST elevation myocardial infarction), [SNOMED-CT: 45492963] 2/3/2020 - Active   Coronary artery disease involving native coronary artery of native heart without angina pectoris, [SNOMED-CT: 491390667] 2/4/2020 - Active   Ischemic cardiomyopathy, [SNOMED-CT: 503590361] 2/4/2020 - Active     HISTORY OF PRESENT ILLNESS    Mr. Sloan is here for a 6-month follow-up visit. He feels good and has had no cardiac symptoms. The chest tightness and shortness of breath that he had last year is no longer occurring. He and his wife walk for exercise frequently.He presented in 2019 with an acute anterior wall MI. He initially had anteroapical dysfunction but that recovered on triple therapy. An echocardiogram from a year ago showed an ejection fraction of 48% with normal valve function. A nuclear stress test done a year ago showed a small fixed perfusion abnormality in the anterior and apical anterior segments. There was no reversibility. Overall ejection fraction is 50%.We are trying to get his LDL cholesterol under 70. Last year on 80 mg of atorvastatin the LDL cholesterol was 89. We added Zetia. Will get a follow-up lipid panel.     IMMUNIZATIONS    No data available    PLAN OF CARE    PLAN OF CARE  Planned Care Date   CMP (comprehensive metabolic panel) 1/1/1900   Lipid panel - Fasting 1/1/1900   CK (Creatine kinase)  1/1/1900   Referral Visit - Colleen Weiler [email or web address]) : 1/1/1900   Follow up visit - Claudio Flores MD 1/1/1900     PROCEDURES    No data available    RESULTS    RESULTS  Name Result Date Location - Ordered By   CK [LOINC: 2157-6] 124 U/L 05/31/2024 11:46:00 AM [facility] LAB ([facility])  Address: [address]  [facility]  [address]  tel:   CHOLESTEROL [LOINC: 2093-3] 157 mg/dL 05/31/2024 11:46:00 AM [facility] LAB ([facility])  Address: [address]  [facility]  [address]  tel:   HDL CHOL [LOINC: 2085-9] 59 mg/dL 05/31/2024 11:46:00 AM [facility] ([facility])  Address: [address]  [facility]  [address]  tel:   TRIGLYCERIDES [LOINC: 2571-8] 43 mg/dL 05/31/2024 11:46:00 AM [facility] ([facility])  Address: [address]  [facility]  [address]  tel:   LDL CHOLESTROL [LOINC: 55982-1] 89 mg/dL 05/31/2024 11:46:00 AM [facility] ([facility])  Address: [address]  [facility]  [address]  tel:   VLDL [LOINC: 73538-0] 7 mg/dL 05/31/2024 11:46:00 AM [facility] ([facility])  Address: [address]  [facility]  [address]  tel:   NON HDL CHOL [LOINC: 86559-2] 98 mg/dL 05/31/2024 11:46:00 AM [facility] ([facility])  Address: [address]  [facility]  [address]  tel:   FASTING PATIENT LIPID ANSWER [LOINC: 38454415] Yes 05/31/2024 11:46:00 AM [facility] ([facility])  Address: [address]  [facility]  [address]  tel:   GLUCOSE [LOINC: 2339-0] 99 mg/dL 05/31/2024 11:46:00 AM [facility] LAB ([facility])  Address: [address]  [facility]  [address]  tel:   SODIUM [LOINC: 2951-2] 141 mmol/L 05/31/2024 11:46:00 AM [facility] LAB ([facility])  Address: [address]  [facility]  [address]  tel:   POTASSIUM [LOINC: 2823-3] 4.4 mmol/L 05/31/2024  11:46:00 AM [facility] LAB ([facility])  Address: [address]  [facility]  [address]  tel:   CHLORIDE [LOINC: 2075-0] 106 mmol/L 05/31/2024 11:46:00 AM [facility] LAB ([facility])  Address: [address]  [facility]  [address]  tel:   CO2 [LOINC: 2028-9] 28.0 mmol/L 05/31/2024 11:46:00 AM [facility] LAB ([facility])  Address: [address]  [facility]  [address]  tel:   ANION GAP [LOINC: 33037-3] 7 mmol/L 05/31/2024 11:46:00 AM [facility] ([facility])  Address: [address]  [facility]  [address]  tel:   BUN [LOINC: 6299-2] 16 mg/dL 05/31/2024 11:46:00 AM [facility] LAB ([facility])  Address: [address]  [facility]  [address]  tel:   CREATININE [LOINC: 74507-8] 1.13 mg/dL 05/31/2024 11:46:00 AM [facility] LAB ([facility])  Address: [address]  [facility]  [address]  tel:   BUN/ CREAT RATIO [LOINC: 3097-3] 14.2 05/31/2024 11:46:00 AM [facility] LAB ([facility])  Address: [address]  [facility]  [address]  tel:   CALCIUM [LOINC: 52901-9] 9.8 mg/dL 05/31/2024 11:46:00 AM [facility] ([facility])  Address: [address]  [facility]  [address]  tel:   OSMOLALITY CALCULATED [LOINC: 09153-1] 293 mOsm/kg 05/31/2024 11:46:00 AM [facility] LAB ([facility])  Address: [address]  [facility]  [address]  tel:   E GFR CR [LOINC: 04752-7] 80 mL/min/1.73m2 05/31/2024 11:46:00 AM [facility] LAB ([facility])  Address: [address]  [facility]  [address]  tel:   ALT [LOINC: 1742-6] 22 U/L 05/31/2024 11:46:00 AM [facility] ([facility])  Address: [address]  [facility]  [address]  tel:   AST [LOINC: 1920-8] 29 U/L 05/31/2024 11:46:00 AM [facility] LAB ([facility])  Address: [address]  [facility]  [address]  tel:    ALKALINE PHOSPHATASE [LOINC: 1779-8] 93 U/L 05/31/2024 11:46:00 AM [facility] LAB ([facility])  Address: [address]  [facility]  [address]  tel:   BILIRUBIN, TOTAL [LOINC: 1975-2] 0.6 mg/dL 05/31/2024 11:46:00 AM [facility] LAB ([facility])  Address: [address]  [facility]  [address]  tel:   TOTAL PROTEIN [LOINC: 2885-2] 7.3 g/dL 05/31/2024 11:46:00 AM [facility] LAB ([facility])  Address: [address]  [facility]  [address]  tel:   ALBUMIN [LOINC: 1751-7] 4.8 g/dL 05/31/2024 11:46:00 AM [facility] LAB ([facility])  Address: [address]  [facility]  [address]  tel:   GLOBULIN [LOINC: 46438-4] 2.5 g/dL 05/31/2024 11:46:00 AM [facility] LAB ([facility])  Address: [address]  [facility]  [address]  tel:   A/G RATIO [LOINC: 1759-0] 1.9 05/31/2024 11:46:00 AM [facility] LAB ([facility])  Address: [address]  [facility]  [address]  tel:   FASTING PATIENT CMP ANSWER [LOINC: 54168-6] Yes 05/31/2024 11:46:00 AM [facility] LAB ([facility])  Address: [address]  [facility]  [address]  tel:   CMP (comprehensive metabolic panel) [LOINC: 95709-8] Pending Schedule next available     Lipid panel - Fasting [LOINC: 70505-2] Pending Schedule next available     CK (Creatine kinase) [LOINC: JFZ4601] Pending Schedule next available     Myocardial Perfusion Imaging 1.Stress EKG is normal. 2.Maximal exercise treadmill test (MPHR : 101 %).3.The functional capacity is excellent (12.1 METs).4.No chest discomfort.5.Occasional PVCs.1.This is an abnormal perfusion study. 2.Small fixed perfusion abnormality of moderate intensity in the apical and apical anterior segments. 3.The left ventricular cavity is noted to be normal on the stress study. The left ventricular ejection fraction was calculated to be 50% and left ventricular global function is normal. Apical  hypokinesis.4.This scan is suggestive of low risk for future cardiovascular events. 5.The study quality is below average. 12/8/2023 7:30:00 AM Benito Benavides MD   Stress Echocardiogram 1.Abnormal study due to apical wall motion abnormality, consistent with prior infarction. No signs of new exercise-induced ischemis.2.Rest echocardiogram shows mild hypokinesis of apical anterior segment and apical lateral segment.Mildly decreased left ventricular systolic function with estimated ejection fraction of 50%. Global left ventricular systolic function is mildly decreased. 3.During peak exercise mild hypokinesis of apical segment persists. The remaimder of the ventrical showed normal contractility, including the areas that were hypokinetic at rest. 11/1/2022 8:00:00 AM Claudio Flores MD   Trans Thoracic Echocardiogram 1.The left ventricle is normal in size. The left ventricular wall thickness is normal. Global left ventricular systolic function is mildly decreased. The left ventricular ejection fraction is 48%. Regional wall motion analysis shows hypokinesis of apiex.. Left ventricular diastolic function is normal.2.The right ventricle is normal in size. Right ventricular systolic function is normal. 3.Trace mitral regurgitation. 11/29/2023 9:00:00 AM Claudio Flores MD     REVIEW OF SYSTEMS    REVIEW OF SYSTEMS  Header Details   Cardiovascular No history of Chest pain, YUAN, Palpitations, Syncope, PND, Orthopnea, Edema, Claudication   Respiratory No history of SOB, Wheezing, Sputum   Hem/Lymphatic No history of Easy bruising, Blood clots, Hx of blood transfusion, Anemia, Bleeding problems     SOCIAL HISTORY    SOCIAL HISTORY  Social History Element Description Effective Dates   Smoking status Former smoker -     FUNCTIONAL STATUS    No data available    MEDICAL EQUIPMENT    No data available    Goals Sections    No data available    REASON FOR REFERRAL                 Health Concerns Section    No data available     COGNITIVE/MENTAL STATUS    No data available    Patient Demographics    Patient Demographics  Patient Address Patient Name Communication   [address] [place] [address] Donald Sloan [phone] (Mobile)     Patient Demographics  Language Race / Ethnicity Marital Status   English - Spoken (Preferred) Unknown /  or       Document Information    Primary Care Provider Other Service Providers Document Coverage Dates   Claudio Flores  NPI: [id number]  [phone] (Work)  [address], [address]  [place] [address]  [place] [address]  Interpreting Physicians  [facility]  [phone] (Work)  [address] [place] [address] Brook Amador  NPI: [id number]  [phone] (Work)  [address], [address]  [place] [address]  [place] [address]  Nurses     Kristina Washington  NPI: [id number]  [phone] (Work)  [address], [address]  [place] [address]  [place] [address]  Nurses Nov. 21, 2024November 21, 2024      Organization   [facility]  [phone] (Work)  [address], [address]  [place] [address]  [place] [address]     Encounter Providers Encounter Date    Nov. 21, 2024November 21, 2024     Legal Authenticator    Claudio Flores  NPI: [id number]  [phone] (Work)  [address], [address]  [place] [address]  [place] [address]

## 2025-06-16 NOTE — CONSULTS
Cardiology (consult dictated)    Assessment:  1.  Chest pain, with some features typical of, others atypical for ischemia.  Initial EKG and troponins normal.    2.  History of CAD, anterior wall MI 2019.  Status post PCI of proximal to mid LAD at that time.  Patient left with small apical scar but overall preserved ejection fraction.  On triple therapy for LV dysfunction.    Plan:  1.  Repeat troponin and EKG at 2 hours.  If they remain normal, would recommend a regular GXT to see if exercise produces chest pain.  If not, he could be discharged home.      Thank you.

## 2025-06-16 NOTE — DISCHARGE INSTRUCTIONS
Follow-up with your primary physician and cardiologist as discussed.  Return to the emergency department if greatly increased pain, difficulty breathing, or other new symptoms develop.

## 2025-06-16 NOTE — IMAGING NOTE
Stress order clarified with DR Flores( [facility] Cardiologist)  Cardiologist wanted to do Plaint GXT stress test.

## 2025-06-17 ENCOUNTER — LAB ENCOUNTER (OUTPATIENT)
Dept: LAB | Facility: HOSPITAL | Age: 50
End: 2025-06-17
Attending: INTERNAL MEDICINE
Payer: COMMERCIAL

## 2025-06-17 DIAGNOSIS — I25.10 CORONARY ATHEROSCLEROSIS OF NATIVE CORONARY ARTERY: Primary | ICD-10-CM

## 2025-06-17 DIAGNOSIS — I10 HTN (HYPERTENSION): ICD-10-CM

## 2025-06-17 LAB
ALBUMIN SERPL-MCNC: 5 G/DL (ref 3.2–4.8)
ALBUMIN/GLOB SERPL: 2.5 {RATIO} (ref 1–2)
ALP LIVER SERPL-CCNC: 101 U/L (ref 45–117)
ALT SERPL-CCNC: 34 U/L (ref 10–49)
ANION GAP SERPL CALC-SCNC: 7 MMOL/L (ref 0–18)
AST SERPL-CCNC: 25 U/L (ref ?–34)
BILIRUB SERPL-MCNC: 0.6 MG/DL (ref 0.3–1.2)
BUN BLD-MCNC: 14 MG/DL (ref 9–23)
BUN/CREAT SERPL: 11.7 (ref 10–20)
CALCIUM BLD-MCNC: 9.7 MG/DL (ref 8.7–10.4)
CHLORIDE SERPL-SCNC: 104 MMOL/L (ref 98–112)
CHOLEST SERPL-MCNC: 70 MG/DL (ref ?–200)
CK SERPL-CCNC: 141 U/L (ref 46–171)
CO2 SERPL-SCNC: 28 MMOL/L (ref 21–32)
CREAT BLD-MCNC: 1.2 MG/DL (ref 0.7–1.3)
EGFRCR SERPLBLD CKD-EPI 2021: 74 ML/MIN/1.73M2 (ref 60–?)
FASTING PATIENT LIPID ANSWER: YES
FASTING STATUS PATIENT QL REPORTED: YES
GLOBULIN PLAS-MCNC: 2 G/DL (ref 2–3.5)
GLUCOSE BLD-MCNC: 105 MG/DL (ref 70–99)
HDLC SERPL-MCNC: 46 MG/DL (ref 40–59)
LDLC SERPL CALC-MCNC: 9 MG/DL (ref ?–100)
NONHDLC SERPL-MCNC: 24 MG/DL (ref ?–130)
OSMOLALITY SERPL CALC.SUM OF ELEC: 289 MOSM/KG (ref 275–295)
POTASSIUM SERPL-SCNC: 4.1 MMOL/L (ref 3.5–5.1)
PROT SERPL-MCNC: 7 G/DL (ref 5.7–8.2)
SODIUM SERPL-SCNC: 139 MMOL/L (ref 136–145)
TRIGL SERPL-MCNC: 67 MG/DL (ref 30–149)
VLDLC SERPL CALC-MCNC: 8 MG/DL (ref 0–30)

## 2025-06-17 PROCEDURE — 80061 LIPID PANEL: CPT

## 2025-06-17 PROCEDURE — 80053 COMPREHEN METABOLIC PANEL: CPT

## 2025-06-17 PROCEDURE — 36415 COLL VENOUS BLD VENIPUNCTURE: CPT

## 2025-06-17 PROCEDURE — 82550 ASSAY OF CK (CPK): CPT

## 2025-06-18 ENCOUNTER — TELEPHONE (OUTPATIENT)
Age: 50
End: 2025-06-18

## 2025-06-25 NOTE — TELEPHONE ENCOUNTER
Called [facility] [employer] and per automated system [employer] pen approved from 6/18/25 to 6/18/2026.

## 2025-07-08 DIAGNOSIS — M47.899 HLA-B27 SPONDYLOARTHROPATHY: ICD-10-CM

## 2025-07-08 DIAGNOSIS — L40.50 PSORIATIC ARTHRITIS (HCC): ICD-10-CM

## 2025-07-08 DIAGNOSIS — Z51.81 MEDICATION MONITORING ENCOUNTER: ICD-10-CM

## 2025-07-08 RX ORDER — SECUKINUMAB 150 MG/ML
INJECTION SUBCUTANEOUS
Qty: 6 EACH | Refills: 3 | Status: SHIPPED | OUTPATIENT
Start: 2025-07-08 | End: 2025-07-10

## 2025-07-08 NOTE — TELEPHONE ENCOUNTER
LOV: 7/12/24  No future appointments.    ASSESSMENT/PLAN:         Psoriatic arthritis with sacroilitis (+HLA-B27 and sacroilitis)- stable  - In the past he was on Humira, Simponi, methotrexate and sulfasalazine  - Blood work showed a positive HLA-B27 and x-ray of the SI joint did show erosions and sacroiliitis  - Joints are stable  - Continue Cosentyx 300 mg monthly, doing well   - Blood work in May 2024 showed normal CMP     Psoriasis  - stable on Cosentyx      Pt will f/u yearly     There is a longitudinal care relationship with me, the care plan reflects the ongoing nature of the continuous relationship of care, and the medical record indicates that there is ongoing treatment of a serious/complex medical condition which I am currently managing.  is Applicable.      Melanie Whitley MD  7/12/2024  12:00 PM

## 2025-07-10 ENCOUNTER — PATIENT MESSAGE (OUTPATIENT)
Age: 50
End: 2025-07-10

## 2025-07-10 ENCOUNTER — TELEPHONE (OUTPATIENT)
Age: 50
End: 2025-07-10

## 2025-07-10 DIAGNOSIS — Z51.81 MEDICATION MONITORING ENCOUNTER: ICD-10-CM

## 2025-07-10 DIAGNOSIS — M47.899 HLA-B27 SPONDYLOARTHROPATHY: ICD-10-CM

## 2025-07-10 DIAGNOSIS — L40.50 PSORIATIC ARTHRITIS (HCC): ICD-10-CM

## 2025-07-10 RX ORDER — SECUKINUMAB 150 MG/ML
300 INJECTION SUBCUTANEOUS
Qty: 6 EACH | Refills: 3 | Status: SHIPPED | OUTPATIENT
Start: 2025-07-10

## 2025-07-10 NOTE — TELEPHONE ENCOUNTER
Current Outpatient Medications   Medication Sig Dispense Refill    COSENTYX SENSOREADY, 300 MG, 150 MG/ML Subcutaneous Solution Auto-injector INJECT 1 PENS UNDER THE SKIN EVERY 4 WEEKS 6 each 3     Received script above. The directions and qty do not match, patient has been getting 2 pens of 150mg pen every 2 weeks. Please clarify if patient is supposed to be on 300mg every 4 wks or 150mg every 4 weeks.

## 2025-07-10 NOTE — TELEPHONE ENCOUNTER
New prescription sent with correct sig.    Requested Prescriptions     Pending Prescriptions Disp Refills    Secukinumab, 300 MG Dose, (COSENTYX SENSOREADY, 300 MG,) 150 MG/ML Subcutaneous Solution Auto-injector 6 each 3     Sig: Inject 300 mg into the skin every 28 days.

## (undated) NOTE — LETTER
12/31/2019          To Whom It May Concern:    Taras Lennox is currently under my medical care and may not return to work at this time. He may return to work on after he follows up with his cardiologist in 3-4 weeks.        If you require additional info

## (undated) NOTE — LETTER
[facility], [place]  Authorization for Invasive Procedures  1.  I hereby authorize Dr. Gladys Jiang** , my physician and whomever may be designated as the doctor's assistant, to perform the following operation and/or procedure:  *Left 5. I consent to the photographing of the operations or procedures to be performed for the purposes of advancing medicine, science, and/or education, provided my identity is not revealed.  If the procedure has been videotaped, the physician/surgeon will obta Witness Signature: ____________________________________________ Date: __________ Time: ___________    Statement of Physician  My signature below affirms that prior to the time of the procedure, I have explained to the patient and/or his legal representativ